# Patient Record
Sex: FEMALE | Race: BLACK OR AFRICAN AMERICAN | Employment: FULL TIME | URBAN - METROPOLITAN AREA
[De-identification: names, ages, dates, MRNs, and addresses within clinical notes are randomized per-mention and may not be internally consistent; named-entity substitution may affect disease eponyms.]

---

## 2017-05-01 ENCOUNTER — OP HISTORICAL/CONVERTED ENCOUNTER (OUTPATIENT)
Dept: OTHER | Age: 47
End: 2017-05-01

## 2019-03-29 ENCOUNTER — ED HISTORICAL/CONVERTED ENCOUNTER (OUTPATIENT)
Dept: OTHER | Age: 49
End: 2019-03-29

## 2019-07-04 ENCOUNTER — ED HISTORICAL/CONVERTED ENCOUNTER (OUTPATIENT)
Dept: OTHER | Age: 49
End: 2019-07-04

## 2019-09-12 ENCOUNTER — ED HISTORICAL/CONVERTED ENCOUNTER (OUTPATIENT)
Dept: OTHER | Age: 49
End: 2019-09-12

## 2020-02-18 ENCOUNTER — ED HISTORICAL/CONVERTED ENCOUNTER (OUTPATIENT)
Dept: OTHER | Age: 50
End: 2020-02-18

## 2020-04-18 ENCOUNTER — ED HISTORICAL/CONVERTED ENCOUNTER (OUTPATIENT)
Dept: OTHER | Age: 50
End: 2020-04-18

## 2020-05-25 ENCOUNTER — ED HISTORICAL/CONVERTED ENCOUNTER (OUTPATIENT)
Dept: OTHER | Age: 50
End: 2020-05-25

## 2020-08-06 ENCOUNTER — ED HISTORICAL/CONVERTED ENCOUNTER (OUTPATIENT)
Dept: OTHER | Age: 50
End: 2020-08-06

## 2020-08-12 ENCOUNTER — ED HISTORICAL/CONVERTED ENCOUNTER (OUTPATIENT)
Dept: OTHER | Age: 50
End: 2020-08-12

## 2020-09-01 ENCOUNTER — OFFICE VISIT (OUTPATIENT)
Dept: INTERNAL MEDICINE CLINIC | Age: 50
End: 2020-09-01
Payer: COMMERCIAL

## 2020-09-01 VITALS
HEIGHT: 61 IN | OXYGEN SATURATION: 98 % | BODY MASS INDEX: 29.11 KG/M2 | SYSTOLIC BLOOD PRESSURE: 138 MMHG | DIASTOLIC BLOOD PRESSURE: 98 MMHG | HEART RATE: 72 BPM | TEMPERATURE: 98.4 F | WEIGHT: 154.2 LBS | RESPIRATION RATE: 18 BRPM

## 2020-09-01 DIAGNOSIS — Z86.59 HISTORY OF DEPRESSION: ICD-10-CM

## 2020-09-01 DIAGNOSIS — K21.9 GASTROESOPHAGEAL REFLUX DISEASE WITHOUT ESOPHAGITIS: ICD-10-CM

## 2020-09-01 DIAGNOSIS — E66.3 OVERWEIGHT WITH BODY MASS INDEX (BMI) OF 29 TO 29.9 IN ADULT: ICD-10-CM

## 2020-09-01 DIAGNOSIS — M25.511 RIGHT SHOULDER PAIN, UNSPECIFIED CHRONICITY: ICD-10-CM

## 2020-09-01 DIAGNOSIS — G43.829 MENSTRUAL MIGRAINE WITHOUT STATUS MIGRAINOSUS, NOT INTRACTABLE: ICD-10-CM

## 2020-09-01 DIAGNOSIS — Z82.49 FAMILY HISTORY OF HYPERTENSION: ICD-10-CM

## 2020-09-01 DIAGNOSIS — M25.511 ACUTE PAIN OF RIGHT SHOULDER: ICD-10-CM

## 2020-09-01 DIAGNOSIS — Z86.79 HISTORY OF HYPERTENSION: ICD-10-CM

## 2020-09-01 DIAGNOSIS — Z82.3 FAMILY HISTORY OF STROKE: ICD-10-CM

## 2020-09-01 DIAGNOSIS — R60.9 PERIPHERAL EDEMA: ICD-10-CM

## 2020-09-01 DIAGNOSIS — I10 ESSENTIAL HYPERTENSION: Primary | ICD-10-CM

## 2020-09-01 DIAGNOSIS — E55.9 VITAMIN D DEFICIENCY: ICD-10-CM

## 2020-09-01 PROCEDURE — 99204 OFFICE O/P NEW MOD 45 MIN: CPT | Performed by: INTERNAL MEDICINE

## 2020-09-01 RX ORDER — HYDROCHLOROTHIAZIDE 12.5 MG/1
12.5 TABLET ORAL
Qty: 30 TAB | Refills: 1 | Status: SHIPPED | OUTPATIENT
Start: 2020-09-01 | End: 2020-10-23 | Stop reason: SDUPTHER

## 2020-09-01 RX ORDER — OMEPRAZOLE 40 MG/1
40 CAPSULE, DELAYED RELEASE ORAL DAILY
Qty: 30 CAP | Refills: 1 | Status: SHIPPED | OUTPATIENT
Start: 2020-09-01 | End: 2020-10-23 | Stop reason: ALTCHOICE

## 2020-09-01 NOTE — PROGRESS NOTES
Laurence Vaughan is a 51 y.o. female and presents with Establish Care (C/O RT SHOULDER pain  3/10 seen [facility] free standing  ER 2020, Also C/O swelling both legs X several months)     she came to establish with me she has been referred by 1 of my patient, she works in the group home, taking care of mentally challenged patient, she had visited,, emergency room near [facility] she did not bring discharge summary, she had acute sharp right shoulder pain started all of a sudden when she was sleeping sometimes she has to lift patient, she was prescribed diclofenac and cyclobenzaprine, now she has no pain she has good range of movement, she could not, take cyclobenzaprine causing drowsiness she has history of headache, while having menstruation, may have a menstruation migraine she takes ibuprofen, she told me that she takes over-the-counter 2 pills of ibuprofen every 6 hourly she complains of swelling, of both lower legs for last several months she had history of depression and hypertension in the past, she used to see nurse practitioner, Ms. Ernestina Nagel at [facility] however according to her she has, retired, she has not done any recent labs she was taken off from antihypertensive by her she told me she is not, having any depression she follows gynecologist, she has history of partial hysterectomy, she also has GERD she told me she was negative for STD she never smokes cigarette or cigar she drinks alcohol occasionally she does not take any recreational drugs, she is a family history of hypertension and history of stroke with her sister who  and she had her father who had pancreatic cancer in mother who had lung cancer. She has never done any lab work on does not take any vitamins. She takes care of the patient who are on disability. No negative thoughts. No anxiety. Her BMI is 29.1. Today her blood pressure is elevated in both upper arms. She eats fast food.       Review of Systems    Review of Systems   Constitutional: Negative. HENT: Negative for congestion and sinus pain. Eyes: Negative for blurred vision. Respiratory: Negative for cough, shortness of breath and wheezing. Cardiovascular: Positive for leg swelling. Complaining of mild swelling of both lower legs for last several months   Gastrointestinal: Negative. Genitourinary: Negative. Musculoskeletal: Positive for joint pain. She had a acute right shoulder pain 2 weeks ago no history of fall or trauma feeling better now, having normal range of movement,.,   Neurological: Negative for dizziness, tingling and weakness. Psychiatric/Behavioral: Negative for depression. The patient is not nervous/anxious and does not have insomnia. Past Medical History:   Diagnosis Date    GERD (gastroesophageal reflux disease)      Past Surgical History:   Procedure Laterality Date    HX GYN      HX PARTIAL HYSTERECTOMY  05/12/2012     Social History     Socioeconomic History    Marital status: SINGLE     Spouse name: Not on file    Number of children: Not on file    Years of education: Not on file    Highest education level: Not on file   Tobacco Use    Smoking status: Never Smoker    Smokeless tobacco: Never Used   Substance and Sexual Activity    Alcohol use: Not Currently    Drug use: Never     Family History   Problem Relation Age of Onset    Hypertension Mother     Cancer Mother     Lung Cancer Mother     Hypertension Father     Cancer Father     Pancreatic Cancer Father        Allergies   Allergen Reactions    Penicillins Rash     ALSO ALLERGIC SEAFOOD  HIVES       Objective:  Visit Vitals  BP (!) 138/98 (BP 1 Location: Right arm, BP Patient Position: Sitting)   Pulse 72   Temp 98.4 °F (36.9 °C) (Oral)   Resp 18   Ht 5' 1\" (1.549 m)   Wt 154 lb 3.2 oz (69.9 kg)   SpO2 98%   BMI 29.14 kg/m²       Physical Exam:   Constitutional: General Appearance: healthy-appearing / obese. Level of Distress: NAD. Ambulation: ambulating   Psychiatric: Mental Status: normal mood and affect and active and alert. Orientation: to time, place, and person. no agitation. ,normal eye contact. normal insight  Head: Head: normocephalic and atraumatic. Eyes: Pupils: PERRLA. Sclerae: non-icteric. Neck: Neck: supple, trachea midline, and no masses. Lymph Nodes: no cervical LAD. Thyroid: no enlargement or nodules and non-tender. Lungs: Respiratory effort: no dyspnea. Auscultation: no wheezing, rales/crackles, or rhonchi and breath sounds normal and good air movement. Cardiovascular: Apical Impulse: not displaced. Heart Auscultation: normal S1 and S2; no murmurs, rubs, or gallops; and RRR. Neck vessels: no carotid bruits. Pulses including femoral / pedal: normal throughout. Abdomen: Bowel Sounds: normal. Inspection and Palpation: no tenderness, guarding, or masses and soft and non-distended. Liver: non-tender and no hepatomegaly. Spleen: non-tender and no splenomegaly. Musculoskeletal[de-identified] Extremities: no edema,no varicosities. No Calf tenderness. Neurologic: Gait and Station: normal gait and station. Motor Strength normal right and left. Skin: Inspection and palpation: no rash, lesions, or ulcer. No results found for this or any previous visit. Assessment/Plan:    ICD-10-CM ICD-9-CM    1. Essential hypertension  I10 401.9 CBC WITH AUTOMATED DIFF      METABOLIC PANEL, COMPREHENSIVE      TSH 3RD GENERATION      URINALYSIS W/ RFLX MICROSCOPIC   2. Acute pain of right shoulder  M25.511 719.41    3. Peripheral edema  R60.9 782.3    4. Menstrual migraine without status migrainosus, not intractable  G43.829 346.40    5. Gastroesophageal reflux disease without esophagitis  K21.9 530.81    6. Family history of hypertension  Z82.49 V17.49    7. Family history of stroke  Z82.3 V17.1    8. History of hypertension  Z86.79 V12.59    9. History of depression  Z86.59 V11.8    10.  Vitamin D deficiency  E55.9 268.9 VITAMIN D, 25 HYDROXY 11. Right shoulder pain, unspecified chronicity  M25.511 719.41    12. Overweight with body mass index (BMI) of 29 to 29.9 in adult  E66.3 278.02     Z68.29 V85.25      Orders Placed This Encounter    CBC WITH AUTOMATED DIFF    METABOLIC PANEL, COMPREHENSIVE    TSH 3RD GENERATION    URINALYSIS W/ RFLX MICROSCOPIC    VITAMIN D, 25 HYDROXY    hydroCHLOROthiazide (HYDRODIURIL) 12.5 mg tablet     Sig: Take 1 Tab by mouth every morning. Indications: visible water retention, high blood pressure     Dispense:  30 Tab     Refill:  1    omeprazole (PRILOSEC) 40 mg capsule     Sig: Take 1 Cap by mouth daily. Take 30 minutes before eating once a day . 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 Also eating fast food, not doing much exercise, not checking blood pressure and she had history of hypertension, but was taken off from antihypertensive by her, previous PCP Ms. Tobin Rojo. 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 Started on hydrochlorothiazide 12.5 mg once a day side effects discussed including electrolyte disturbance hyponatremia and hypokalemia take diet rich in potassium and  labs ordered. Peripheral edema may be due to long-term use of NSAIDs also may be due to prolonged standing, keep legs propped up position, started on hydrochlorothiazide,. Diet low in sodium, TSH ordered.       Menstrual migraine she can take Tylenol use NSAIDs very sparingly and follow the recommendation given by gynecologist.    GERD started on omeprazole may be due to taking NSAIDs and fast food. Right shoulder pain currently she has normal range of movement and she can take Tylenol 650 mg twice a day as needed. Heating pad application,  And it seems like she has almost recovered and no history of fall or trauma f  Family history of hypertension and stroke heart healthy diet and DASH diet, and exercise. Personal history of hypertension blood pressure monitoring at home. Diet low in sodium and started on HCTZ. History of depression currently no negative thoughts. ,    Vitamin D deficiency vitamin D level ordered. overweight lifestyle modification and BMI is 29.1. Education and counseling given answered all her questions and follow-up in 6 weeks with blood pressure log and fasting labs ordered. Refills given and side effects discussed. lose weight, increase physical activity, bring BP log to office visit, follow low fat diet, follow low salt diet, routine labs ordered, call if any problems    There are no Patient Instructions on file for this visit. Follow-up and Dispositions    · Return in about 6 weeks (around 10/13/2020) for htn ,leo edema ,headache ,nonfasting labs now.

## 2020-09-05 ENCOUNTER — TELEPHONE (OUTPATIENT)
Dept: INTERNAL MEDICINE CLINIC | Age: 50
End: 2020-09-05

## 2020-09-05 LAB
25(OH)D3+25(OH)D2 SERPL-MCNC: 14.2 NG/ML (ref 30–100)
ALBUMIN SERPL-MCNC: 4.8 G/DL (ref 3.8–4.8)
ALBUMIN/GLOB SERPL: 1.7 {RATIO} (ref 1.2–2.2)
ALP SERPL-CCNC: 65 IU/L (ref 39–117)
ALT SERPL-CCNC: 36 IU/L (ref 0–32)
APPEARANCE UR: CLEAR
AST SERPL-CCNC: 34 IU/L (ref 0–40)
BASOPHILS # BLD AUTO: 0.1 X10E3/UL (ref 0–0.2)
BASOPHILS NFR BLD AUTO: 1 %
BILIRUB SERPL-MCNC: 0.4 MG/DL (ref 0–1.2)
BILIRUB UR QL STRIP: NEGATIVE
BUN SERPL-MCNC: 6 MG/DL (ref 6–24)
BUN/CREAT SERPL: 8 (ref 9–23)
CALCIUM SERPL-MCNC: 9.9 MG/DL (ref 8.7–10.2)
CHLORIDE SERPL-SCNC: 94 MMOL/L (ref 96–106)
CO2 SERPL-SCNC: 24 MMOL/L (ref 20–29)
COLOR UR: YELLOW
CREAT SERPL-MCNC: 0.77 MG/DL (ref 0.57–1)
EOSINOPHIL # BLD AUTO: 0.1 X10E3/UL (ref 0–0.4)
EOSINOPHIL NFR BLD AUTO: 1 %
ERYTHROCYTE [DISTWIDTH] IN BLOOD BY AUTOMATED COUNT: 12.5 % (ref 11.7–15.4)
GLOBULIN SER CALC-MCNC: 2.8 G/DL (ref 1.5–4.5)
GLUCOSE SERPL-MCNC: 87 MG/DL (ref 65–99)
GLUCOSE UR QL: NEGATIVE
HCT VFR BLD AUTO: 41.1 % (ref 34–46.6)
HGB BLD-MCNC: 13.4 G/DL (ref 11.1–15.9)
HGB UR QL STRIP: NEGATIVE
IMM GRANULOCYTES # BLD AUTO: 0 X10E3/UL (ref 0–0.1)
IMM GRANULOCYTES NFR BLD AUTO: 0 %
KETONES UR QL STRIP: NEGATIVE
LEUKOCYTE ESTERASE UR QL STRIP: NEGATIVE
LYMPHOCYTES # BLD AUTO: 2.4 X10E3/UL (ref 0.7–3.1)
LYMPHOCYTES NFR BLD AUTO: 42 %
MCH RBC QN AUTO: 28.8 PG (ref 26.6–33)
MCHC RBC AUTO-ENTMCNC: 32.6 G/DL (ref 31.5–35.7)
MCV RBC AUTO: 88 FL (ref 79–97)
MICRO URNS: NORMAL
MONOCYTES # BLD AUTO: 0.6 X10E3/UL (ref 0.1–0.9)
MONOCYTES NFR BLD AUTO: 10 %
NEUTROPHILS # BLD AUTO: 2.6 X10E3/UL (ref 1.4–7)
NEUTROPHILS NFR BLD AUTO: 46 %
NITRITE UR QL STRIP: NEGATIVE
PH UR STRIP: 6.5 [PH] (ref 5–7.5)
PLATELET # BLD AUTO: 312 X10E3/UL (ref 150–450)
POTASSIUM SERPL-SCNC: 3.7 MMOL/L (ref 3.5–5.2)
PROT SERPL-MCNC: 7.6 G/DL (ref 6–8.5)
PROT UR QL STRIP: NEGATIVE
RBC # BLD AUTO: 4.65 X10E6/UL (ref 3.77–5.28)
SODIUM SERPL-SCNC: 135 MMOL/L (ref 134–144)
SP GR UR: 1.01 (ref 1–1.03)
TSH SERPL DL<=0.005 MIU/L-ACNC: 0.8 UIU/ML (ref 0.45–4.5)
UROBILINOGEN UR STRIP-MCNC: 0.2 MG/DL (ref 0.2–1)
WBC # BLD AUTO: 5.7 X10E3/UL (ref 3.4–10.8)

## 2020-09-05 RX ORDER — ERGOCALCIFEROL 1.25 MG/1
50000 CAPSULE ORAL
Qty: 12 CAP | Refills: 0 | Status: SHIPPED | OUTPATIENT
Start: 2020-09-05 | End: 2020-10-23 | Stop reason: ALTCHOICE

## 2020-09-05 NOTE — TELEPHONE ENCOUNTER
S/W PT. To review results of labs, per Dr. Manuel Elizabeth, VIT D level is low 14.2 ( should at least be 30.3), RX has been sent to the pharmacy 50,000 units to take once weekly,start increasing the amount protein in your diet, SGPT 36 ( should not be over 32), walk more, and decrease the amt of NSAIDS ( aleve, advil ,ibuprofen and naproxen), Pt. Agreed.   TSH ( thyroid is  Normal.

## 2020-09-05 NOTE — PROGRESS NOTES
Please call her    #1 she should take in the increase intake of protein in the diet. 2.  Her vitamin D is low I will send prescription of vitamin D once a week for 3 months to the pharmacy. 3.  She should walk more because her one liver enzyme is minimally elevated it is called fatty liver and do not take excessive use of NSAIDs over-the-counter. It should be mindful use.     4.  Thyroid is normal.

## 2020-10-13 PROBLEM — R93.0 ABNORMAL FINDINGS ON DIAGNOSTIC IMAGING OF SKULL AND HEAD, NOT ELSEWHERE CLASSIFIED: Status: ACTIVE | Noted: 2020-10-13

## 2020-10-13 PROBLEM — N94.6 DYSMENORRHEA: Status: ACTIVE | Noted: 2020-10-13

## 2020-10-13 PROBLEM — N92.1 IRREGULAR INTERMENSTRUAL BLEEDING: Status: ACTIVE | Noted: 2020-10-13

## 2020-10-23 ENCOUNTER — OFFICE VISIT (OUTPATIENT)
Dept: INTERNAL MEDICINE CLINIC | Age: 50
End: 2020-10-23
Payer: COMMERCIAL

## 2020-10-23 VITALS
HEART RATE: 66 BPM | BODY MASS INDEX: 28.85 KG/M2 | RESPIRATION RATE: 18 BRPM | DIASTOLIC BLOOD PRESSURE: 70 MMHG | TEMPERATURE: 98 F | SYSTOLIC BLOOD PRESSURE: 118 MMHG | HEIGHT: 61 IN | OXYGEN SATURATION: 99 % | WEIGHT: 152.8 LBS

## 2020-10-23 DIAGNOSIS — Z23 ENCOUNTER FOR IMMUNIZATION: ICD-10-CM

## 2020-10-23 DIAGNOSIS — K21.9 GASTROESOPHAGEAL REFLUX DISEASE WITHOUT ESOPHAGITIS: ICD-10-CM

## 2020-10-23 DIAGNOSIS — Z23 NEEDS FLU SHOT: ICD-10-CM

## 2020-10-23 DIAGNOSIS — I10 ESSENTIAL HYPERTENSION: Primary | ICD-10-CM

## 2020-10-23 PROCEDURE — 99213 OFFICE O/P EST LOW 20 MIN: CPT | Performed by: INTERNAL MEDICINE

## 2020-10-23 PROCEDURE — 90756 CCIIV4 VACC ABX FREE IM: CPT | Performed by: INTERNAL MEDICINE

## 2020-10-23 RX ORDER — HYDROCHLOROTHIAZIDE 12.5 MG/1
12.5 TABLET ORAL EVERY MORNING
Qty: 90 TAB | Refills: 1 | Status: SHIPPED | OUTPATIENT
Start: 2020-10-23 | End: 2021-05-10

## 2020-10-23 RX ORDER — OMEPRAZOLE 40 MG/1
1 CAPSULE, DELAYED RELEASE ORAL DAILY
COMMUNITY
End: 2020-10-23 | Stop reason: SDUPTHER

## 2020-10-23 RX ORDER — CYCLOBENZAPRINE HCL 10 MG
1 TABLET ORAL DAILY
COMMUNITY
Start: 2020-08-19 | End: 2020-10-23 | Stop reason: ALTCHOICE

## 2020-10-23 RX ORDER — DICLOFENAC SODIUM 50 MG/1
1 TABLET, DELAYED RELEASE ORAL DAILY
COMMUNITY
Start: 2020-08-19 | End: 2020-10-23 | Stop reason: ALTCHOICE

## 2020-10-23 RX ORDER — OMEPRAZOLE 40 MG/1
40 CAPSULE, DELAYED RELEASE ORAL
Qty: 90 CAP | Refills: 0 | Status: SHIPPED | OUTPATIENT
Start: 2020-10-23 | End: 2022-04-01

## 2020-10-23 NOTE — PROGRESS NOTES
Payton Gonzalez is a 51 y.o. female and presents with Follow-up; Hypertension; and Follow Up Chronic Condition    Her blood pressure is wonderfully controlled being on hydrochlorothiazide 12.5 mg/day she has no more shoulder pain, she has great improvement after being on omeprazole, she has started drinking green smoothie and, eating healthy diet and started walking and doing exercise, she wants to have flu vaccine , she has done her labs I reviewed and discussed with her.     her vitamin D level is low ,I ordered vitamin D once a week for 2 months and then OTC vitamin D3 2000 units/day. Her vitamin D level is 14.2,Her ALT is 36. I discussed with her. Review of Systems    Review of Systems   Constitutional: Negative. HENT: Negative for sinus pain. Eyes: Negative for blurred vision. Respiratory: Negative for shortness of breath and wheezing. Cardiovascular: Negative. Gastrointestinal: Negative. Musculoskeletal: Negative. Neurological: Negative for dizziness, tremors and headaches. Psychiatric/Behavioral: Negative for depression. The patient is not nervous/anxious.          Past Medical History:   Diagnosis Date    Abnormal findings on diagnostic imaging of skull and head, not elsewhere classified 10/13/2020    Dysmenorrhea 10/13/2020    GERD (gastroesophageal reflux disease)     Irregular intermenstrual bleeding 10/13/2020     Past Surgical History:   Procedure Laterality Date    HX GYN      HX PARTIAL HYSTERECTOMY  05/12/2012     Social History     Socioeconomic History    Marital status: SINGLE     Spouse name: Not on file    Number of children: Not on file    Years of education: Not on file    Highest education level: Not on file   Tobacco Use    Smoking status: Never Smoker    Smokeless tobacco: Never Used   Substance and Sexual Activity    Alcohol use: Not Currently    Drug use: Never     Family History   Problem Relation Age of Onset    Hypertension Mother     Cancer Mother    [place] Mother     Hypertension Father     Cancer Father     Pancreatic Cancer Father      Current Outpatient Medications   Medication Sig Dispense Refill    omeprazole (PRILOSEC) 40 mg capsule Take 1 Cap by mouth daily as needed for Pain or Other (if gerd worsens). Indications: gastroesophageal reflux disease 90 Cap 0    hydroCHLOROthiazide (HYDRODIURIL) 12.5 mg tablet Take 1 Tab by mouth Every morning. Indications: visible water retention, high blood pressure 90 Tab 1     Allergies   Allergen Reactions    Penicillins Rash     ALSO ALLERGIC SEAFOOD  HIVES       Objective:  Visit Vitals  /70 (BP 1 Location: Right arm, BP Patient Position: Sitting)   Pulse 66   Temp 98 °F (36.7 °C) (Temporal)   Resp 18   Ht 5' 1\" (1.549 m)   Wt 152 lb 12.8 oz (69.3 kg)   SpO2 99%   BMI 28.87 kg/m²     Hypertension controlled  Physical Exam:   Constitutional: General Appearance: Mild overweight with pleasant personality. Level of Distress: NAD. Psychiatric: Mental Status: normal mood and affect Orientation: to time, place, and person. ,normal eye contact. Head: Head: normocephalic and atraumatic. Eyes: Pupils: PERRLA. Sclerae: non-icteric. Neck: Neck: supple, trachea midline, and no masses. Lymph Nodes: no cervical LAD. Thyroid: no enlargement or nodules and non-tender. Lungs: Respiratory effort: no dyspnea. Auscultation: no wheezing, rales/crackles, or rhonchi and breath sounds normal and good air movement. Cardiovascular: Apical Impulse: not displaced. Heart Auscultation: normal S1 and S2; no murmurs, rubs, or gallops; and RRR. Neck vessels: no carotid bruits. Pulses including femoral / pedal: normal throughout. Abdomen: Bowel Sounds: normal. Inspection and Palpation: no tenderness, guarding, or masses and soft and non-distended. Liver: non-tender and no hepatomegaly. Spleen: non-tender and no splenomegaly. Musculoskeletal[de-identified] Extremities: no edema,no varicosities.  No Calf tenderness. Neurologic: Gait and Station: normal gait and station. Motor Strength normal right and left. Skin: Inspection and palpation: no rash, lesions, or ulcer. Results for orders placed or performed in visit on 09/01/20   CBC WITH AUTOMATED DIFF   Result Value Ref Range    WBC 5.7 3.4 - 10.8 x10E3/uL    RBC 4.65 3.77 - 5.28 x10E6/uL    HGB 13.4 11.1 - 15.9 g/dL    HCT 41.1 34.0 - 46.6 %    MCV 88 79 - 97 fL    MCH 28.8 26.6 - 33.0 pg    MCHC 32.6 31.5 - 35.7 g/dL    RDW 12.5 11.7 - 15.4 %    PLATELET 120 741 - 125 x10E3/uL    NEUTROPHILS 46 Not Estab. %    Lymphocytes 42 Not Estab. %    MONOCYTES 10 Not Estab. %    EOSINOPHILS 1 Not Estab. %    BASOPHILS 1 Not Estab. %    ABS. NEUTROPHILS 2.6 1.4 - 7.0 x10E3/uL    Abs Lymphocytes 2.4 0.7 - 3.1 x10E3/uL    ABS. MONOCYTES 0.6 0.1 - 0.9 x10E3/uL    ABS. EOSINOPHILS 0.1 0.0 - 0.4 x10E3/uL    ABS. BASOPHILS 0.1 0.0 - 0.2 x10E3/uL    IMMATURE GRANULOCYTES 0 Not Estab. %    ABS. IMM. GRANS. 0.0 0.0 - 0.1 [id number]/KI   METABOLIC PANEL, COMPREHENSIVE   Result Value Ref Range    Glucose 87 65 - 99 mg/dL    BUN 6 6 - 24 mg/dL    Creatinine 0.77 0.57 - 1.00 mg/dL    GFR est non-AA 92 >59 mL/min/1.73    GFR est  >59 mL/min/1.73    BUN/Creatinine ratio 8 (L) 9 - 23    Sodium 135 134 - 144 mmol/L    Potassium 3.7 3.5 - 5.2 mmol/L    Chloride 94 (L) 96 - 106 mmol/L    CO2 24 20 - 29 mmol/L    Calcium 9.9 8.7 - 10.2 mg/dL    Protein, total 7.6 6.0 - 8.5 g/dL    Albumin 4.8 3.8 - 4.8 g/dL    GLOBULIN, TOTAL 2.8 1.5 - 4.5 g/dL    A-G Ratio 1.7 1.2 - 2.2    Bilirubin, total 0.4 0.0 - 1.2 mg/dL    Alk.  phosphatase 65 39 - 117 IU/L    AST (SGOT) 34 0 - 40 IU/L    ALT (SGPT) 36 (H) 0 - 32 IU/L   TSH 3RD GENERATION   Result Value Ref Range    TSH 0.797 0.450 - 4.500 uIU/mL   URINALYSIS W/ RFLX MICROSCOPIC   Result Value Ref Range    Specific Gravity 1.010 1.005 - 1.030    pH (UA) 6.5 5.0 - 7.5    Color Yellow Yellow    Appearance Clear Clear    Leukocyte Esterase Negative Negative    Protein Negative Negative/Trace    Glucose Negative Negative    Ketone Negative Negative    Blood Negative Negative    Bilirubin Negative Negative    Urobilinogen 0.2 0.2 - 1.0 mg/dL    Nitrites Negative Negative    Microscopic Examination Comment    VITAMIN D, 25 HYDROXY   Result Value Ref Range    VITAMIN D, 25-HYDROXY 14.2 (L) 30.0 - 100.0 ng/mL       Assessment/Plan:      ICD-10-CM ICD-9-CM    1. Essential hypertension  I10 401.9    2. Gastroesophageal reflux disease without esophagitis  K21.9 530.81    3. Encounter for immunization  Z23 V03.89    4. Needs flu shot  Z23 V04.81 INFLUENZA VACCINE (CCIIV4 VACCINE ANTIBIO FREE 0.5 ML)     Orders Placed This Encounter    Influenza Vaccine, QUAD, Vial (Flucelvax VIAL 61821)    DISCONTD: cyclobenzaprine (FLEXERIL) 10 mg tablet     Sig: Take 1 Tab by mouth daily.  DISCONTD: diclofenac EC (VOLTAREN) 50 mg EC tablet     Sig: Take 1 Tab by mouth daily.  DISCONTD: omeprazole (PRILOSEC) 40 mg capsule     Sig: Take 1 Cap by mouth daily.  omeprazole (PRILOSEC) 40 mg capsule     Sig: Take 1 Cap by mouth daily as needed for Pain or Other (if gerd worsens). Indications: gastroesophageal reflux disease     Dispense:  90 Cap     Refill:  0    hydroCHLOROthiazide (HYDRODIURIL) 12.5 mg tablet     Sig: Take 1 Tab by mouth Every morning. Indications: visible water retention, high blood pressure     Dispense:  90 Tab     Refill:  1         Hypertension controlled, hydrochlorothiazide 12.5 mg once a day. Diet low in sodium. GERD continue omeprazole as needed explained that she should not take every day if she has no symptoms to prevent C. difficile infection and osteopenia and other side effects. Shoulder pain she is completely asymptomatic. Overweight she has started walking and eating healthy diet. Vitamin D deficiency vitamin D once a week for 3 months and then OTC vitamin D3 2000 units/day. She wants to see me in 3 months.   Lab results reviewed and discussed with her. Answered all her questions. Refills given. flu vaccine given in the office. lose weight, increase physical activity, follow low fat diet, follow low salt diet, continue present plan, call if any problems    There are no Patient Instructions on file for this visit. Follow-up and Dispositions    · Return in about 3 months (around 1/23/2021) for htn,gerd ,flu.

## 2021-01-25 ENCOUNTER — OFFICE VISIT (OUTPATIENT)
Dept: INTERNAL MEDICINE CLINIC | Age: 51
End: 2021-01-25
Payer: COMMERCIAL

## 2021-01-25 VITALS
BODY MASS INDEX: 27.94 KG/M2 | DIASTOLIC BLOOD PRESSURE: 68 MMHG | SYSTOLIC BLOOD PRESSURE: 118 MMHG | RESPIRATION RATE: 18 BRPM | OXYGEN SATURATION: 99 % | HEIGHT: 61 IN | WEIGHT: 148 LBS | HEART RATE: 72 BPM

## 2021-01-25 DIAGNOSIS — K21.9 GASTROESOPHAGEAL REFLUX DISEASE WITHOUT ESOPHAGITIS: ICD-10-CM

## 2021-01-25 DIAGNOSIS — E55.9 VITAMIN D DEFICIENCY: ICD-10-CM

## 2021-01-25 DIAGNOSIS — F41.0 ANXIETY ATTACK: ICD-10-CM

## 2021-01-25 DIAGNOSIS — I10 ESSENTIAL HYPERTENSION: ICD-10-CM

## 2021-01-25 PROCEDURE — 99213 OFFICE O/P EST LOW 20 MIN: CPT | Performed by: INTERNAL MEDICINE

## 2021-01-25 RX ORDER — HYDROXYZINE 25 MG/1
25 TABLET, FILM COATED ORAL
Qty: 60 TAB | Refills: 1 | Status: SHIPPED | OUTPATIENT
Start: 2021-01-25 | End: 2021-02-24

## 2021-01-25 NOTE — PROGRESS NOTES
Hilda Cano is a 53 y.o. female and presents with Follow Up Chronic Condition (htn,gerd )      Ms. Tanya Bhat came for regular follow-up. 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 Sometimes she has stress and anxiety and sometimes she cries but she does not want to be on antidepression because she told me that she is not depressed but started on hydroxyzine having anxiety spells. Her blood pressure is wonderfully controlled. After being on hydrochlorothiazide she has no complain of ankle swelling. She takes omeprazole as needed. She has wonderful pleasant personality. she had low vitamin D level and she was given high-dose for 3 months and now taking over-the-counter medication . Review of Systems    Review of Systems   Constitutional: Negative. HENT: Negative for hearing loss and sore throat. Eyes: Negative for blurred vision. Respiratory: Negative for cough and wheezing. Cardiovascular: Negative. Gastrointestinal: Negative. Genitourinary: Negative. Musculoskeletal: Negative. Negative for joint pain and myalgias. Neurological: Negative for dizziness and headaches. Psychiatric/Behavioral: Negative for depression. The patient is nervous/anxious.          Past Medical History:   Diagnosis Date    Abnormal findings on diagnostic imaging of skull and head, not elsewhere classified 10/13/2020    Dysmenorrhea 10/13/2020    Essential hypertension 1/25/2021    GERD (gastroesophageal reflux disease)     Irregular intermenstrual bleeding 10/13/2020     Past Surgical History:   Procedure Laterality Date    HX GYN      HX PARTIAL HYSTERECTOMY  05/12/2012     Social History     Socioeconomic History    Marital status: SINGLE     Spouse name: Not on file    Number of children: Not on file    Years of education: Not on file    Highest education level: Not on file   Tobacco Use    Smoking status: Never Smoker    Smokeless tobacco: Never Used   Substance and Sexual Activity    Alcohol use: Not Currently    Drug use: Never     Family History   Problem Relation Age of Onset    Hypertension Mother     Cancer Mother     Lung Cancer Mother     Hypertension Father     Cancer Father     Pancreatic Cancer Father      Current Outpatient Medications   Medication Sig Dispense Refill    hydrOXYzine HCL (ATARAX) 25 mg tablet Take 1 Tab by mouth two (2) times daily as needed for Anxiety for up to 30 days. 60 Tab 1    omeprazole (PRILOSEC) 40 mg capsule Take 1 Cap by mouth daily as needed for Pain or Other (if gerd worsens). Indications: gastroesophageal reflux disease 90 Cap 0    hydroCHLOROthiazide (HYDRODIURIL) 12.5 mg tablet Take 1 Tab by mouth Every morning. Indications: visible water retention, high blood pressure 90 Tab 1     Allergies   Allergen Reactions    Penicillins Rash     ALSO ALLERGIC SEAFOOD  HIVES       Objective:  Visit Vitals  /68 (BP 1 Location: Right arm, BP Patient Position: Sitting)   Pulse 72   Resp 18   Ht 5' 1\" (1.549 m)   Wt 148 lb (67.1 kg)   SpO2 99%   BMI 27.96 kg/m²       Physical Exam:   Constitutional: General Appearance: Pleasant. Level of Distress: NAD. Psychiatric: Mental Status: normal mood and affect Orientation: to time, place, and person. ,normal eye contact. Head: Head: normocephalic and atraumatic. Eyes: Pupils: PERRLA. Sclerae: non-icteric. Neck: Neck: supple, trachea midline, and no masses. Lymph Nodes: no cervical LAD. Thyroid: no enlargement or nodules and non-tender. Lungs: Respiratory effort: no dyspnea. Auscultation: no wheezing, rales/crackles, or rhonchi and breath sounds normal and good air movement. Cardiovascular: Apical Impulse: not displaced. Heart Auscultation: normal S1 and S2; no murmurs, rubs, or gallops; and RRR. Neck vessels: no carotid bruits. Pulses including femoral / pedal: normal throughout. Abdomen:  Bowel Sounds: normal. Inspection and Palpation: no tenderness, guarding, or masses and soft and non-distended. Liver: non-tender and no hepatomegaly. Spleen: non-tender and no splenomegaly. Musculoskeletal[de-identified] Extremities: no edema,no varicosities. No Calf tenderness. Neurologic: Gait and Station: normal gait and station. Motor Strength normal right and left. Skin: Inspection and palpation: no rash, lesions, or ulcer. Results for orders placed or performed in visit on 09/01/20   CBC WITH AUTOMATED DIFF   Result Value Ref Range    WBC 5.7 3.4 - 10.8 x10E3/uL    RBC 4.65 3.77 - 5.28 x10E6/uL    HGB 13.4 11.1 - 15.9 g/dL    HCT 41.1 34.0 - 46.6 %    MCV 88 79 - 97 fL    MCH 28.8 26.6 - 33.0 pg    MCHC 32.6 31.5 - 35.7 g/dL    RDW 12.5 11.7 - 15.4 %    PLATELET 543 934 - 706 x10E3/uL    NEUTROPHILS 46 Not Estab. %    Lymphocytes 42 Not Estab. %    MONOCYTES 10 Not Estab. %    EOSINOPHILS 1 Not Estab. %    BASOPHILS 1 Not Estab. %    ABS. NEUTROPHILS 2.6 1.4 - 7.0 x10E3/uL    Abs Lymphocytes 2.4 0.7 - 3.1 x10E3/uL    ABS. MONOCYTES 0.6 0.1 - 0.9 x10E3/uL    ABS. EOSINOPHILS 0.1 0.0 - 0.4 x10E3/uL    ABS. BASOPHILS 0.1 0.0 - 0.2 x10E3/uL    IMMATURE GRANULOCYTES 0 Not Estab. %    ABS. IMM. GRANS. 0.0 0.0 - 0.1 W36I9/VT   METABOLIC PANEL, COMPREHENSIVE   Result Value Ref Range    Glucose 87 65 - 99 mg/dL    BUN 6 6 - 24 mg/dL    Creatinine 0.77 0.57 - 1.00 mg/dL    GFR est non-AA 92 >59 mL/min/1.73    GFR est  >59 mL/min/1.73    BUN/Creatinine ratio 8 (L) 9 - 23    Sodium 135 134 - 144 mmol/L    Potassium 3.7 3.5 - 5.2 mmol/L    Chloride 94 (L) 96 - 106 mmol/L    CO2 24 20 - 29 mmol/L    Calcium 9.9 8.7 - 10.2 mg/dL    Protein, total 7.6 6.0 - 8.5 g/dL    Albumin 4.8 3.8 - 4.8 g/dL    GLOBULIN, TOTAL 2.8 1.5 - 4.5 g/dL    A-G Ratio 1.7 1.2 - 2.2    Bilirubin, total 0.4 0.0 - 1.2 mg/dL    Alk.  phosphatase 65 39 - 117 IU/L    AST (SGOT) 34 0 - 40 IU/L    ALT (SGPT) 36 (H) 0 - 32 IU/L   TSH 3RD GENERATION   Result Value Ref Range    TSH 0.797 0.450 - 4.500 uIU/mL   URINALYSIS W/ RFLX MICROSCOPIC   Result Value Ref Range    Specific Gravity 1.010 1.005 - 1.030    pH (UA) 6.5 5.0 - 7.5    Color Yellow Yellow    Appearance Clear Clear    Leukocyte Esterase Negative Negative    Protein Negative Negative/Trace    Glucose Negative Negative    Ketone Negative Negative    Blood Negative Negative    Bilirubin Negative Negative    Urobilinogen 0.2 0.2 - 1.0 mg/dL    Nitrites Negative Negative    Microscopic Examination Comment    VITAMIN D, 25 HYDROXY   Result Value Ref Range    VITAMIN D, 25-HYDROXY 14.2 (L) 30.0 - 100.0 ng/mL       Assessment/Plan:      ICD-10-CM ICD-9-CM    1. Essential hypertension  I10 401.9    2. Gastroesophageal reflux disease without esophagitis  K21.9 530.81    3. Anxiety attack  F41.0 300.01    4. Vitamin D deficiency  E55.9 268.9      Orders Placed This Encounter    hydrOXYzine HCL (ATARAX) 25 mg tablet     Sig: Take 1 Tab by mouth two (2) times daily as needed for Anxiety for up to 30 days. Dispense:  60 Tab     Refill:  1       Hypertension controlled no peripheral edema after being on hydrochlorothiazide 12.5 mg once a day. Diet low in sodium. GERD continue omeprazole 40 mg once a day as needed. Vitamin D deficiency continue OTC vitamin D3 2000 unit/day. History of anxiety spells and the stress from the work but no negative thoughts and does not want to be on maintenance medicine with SSRI and started on hydroxyzine 25 mg twice a day as needed. She is due for her colonoscopy. I explained that she can do either Cologuard or screening colonoscopy and she has no family history of colon cancer. She is also due for Pap smear that she go to [facility] height and she had normal Pap smear last year. It was done last year. She is due for this year. Patient is reminded. She can do annual physical with me.    continue present plan, call if any problems    There are no Patient Instructions on file for this visit.    Follow-up and Dispositions    · Return in about 6 months (around 7/25/2021) for htn,gerd ,anxiety ,vit d def.

## 2021-07-27 ENCOUNTER — TELEPHONE (OUTPATIENT)
Dept: INTERNAL MEDICINE CLINIC | Age: 51
End: 2021-07-27

## 2021-07-27 RX ORDER — ESCITALOPRAM OXALATE 10 MG/1
10 TABLET ORAL DAILY
Qty: 30 TABLET | Refills: 2 | Status: SHIPPED | OUTPATIENT
Start: 2021-07-27 | End: 2022-04-01

## 2021-07-27 NOTE — TELEPHONE ENCOUNTER
Pt called stating that she has been having difficulty sleeping, and has been very emotional for the past 2 weeks.

## 2022-01-17 ENCOUNTER — TELEPHONE (OUTPATIENT)
Dept: INTERNAL MEDICINE CLINIC | Age: 52
End: 2022-01-17

## 2022-01-17 ENCOUNTER — NURSE TRIAGE (OUTPATIENT)
Dept: OTHER | Facility: CLINIC | Age: 52
End: 2022-01-17

## 2022-01-17 NOTE — TELEPHONE ENCOUNTER
Dr. Nelsy Castillo at bedside discussing d/c instructions      Zollie Lesches V, RN  09/01/20 9935 Subjective: Caller states \"Chest pain\"     Current Symptoms: Chest pain, worse with coughing. Denies nausea or abd pain. C/O Chills and sore throat    Onset: 4 days ago; intermittent    Associated Symptoms: Fatigue    Pain Severity: 5/10; aching; intermittent    Temperature: Unknown    What has been tried: Warm tea with lemon and honey      Recommended disposition: See PCP within 24hrs; ED/UCC if no appointments available. Care advice provided, patient verbalizes understanding; denies any other questions or concerns; instructed to call back for any new or worsening symptoms. Writer provided warm transfer to Zenaida at [facility] for appointment scheduling    Attention Provider: Thank you for allowing me to participate in the care of your patient. The patient was connected to triage in response to information provided to the [facility]. Please do not respond through this encounter as the response is not directed to a shared pool.       Reason for Disposition   [1] Chest pain lasts > 5 minutes AND [2] occurred > 3 days ago (72 hours) AND [3] NO chest pain or cardiac symptoms now    Protocols used: CHEST PAIN-ADULT-

## 2022-01-17 NOTE — TELEPHONE ENCOUNTER
Received call from ANETTE at [facility], caller not on line. Complaint: chest pains pt ? If has PNA      Market: [employer] Name: [employer] telephone number verified as [phone]    Unsuccessful attempt to re-connect with caller via phone, left message for return call to office     Reason for Disposition   Message left on unidentified voice mail. Phone number verified.     Protocols used: NO CONTACT OR DUPLICATE CONTACT CALL-ADULT-

## 2022-01-25 ENCOUNTER — HOSPITAL ENCOUNTER (EMERGENCY)
Age: 52
Discharge: HOME | End: 2022-01-25
Attending: FAMILY MEDICINE
Payer: COMMERCIAL

## 2022-01-25 VITALS
OXYGEN SATURATION: 100 % | WEIGHT: 150 LBS | HEIGHT: 61 IN | RESPIRATION RATE: 16 BRPM | SYSTOLIC BLOOD PRESSURE: 145 MMHG | TEMPERATURE: 98.1 F | BODY MASS INDEX: 28.32 KG/M2 | HEART RATE: 80 BPM | DIASTOLIC BLOOD PRESSURE: 91 MMHG

## 2022-01-25 DIAGNOSIS — B34.9 VIRAL SYNDROME: Primary | ICD-10-CM

## 2022-01-25 LAB — SARS-COV-2, COV2: NORMAL

## 2022-01-25 PROCEDURE — 99282 EMERGENCY DEPT VISIT SF MDM: CPT

## 2022-01-25 PROCEDURE — U0005 INFEC AGEN DETEC AMPLI PROBE: HCPCS

## 2022-01-25 NOTE — ED PROVIDER NOTES
EMERGENCY DEPARTMENT HISTORY AND PHYSICAL EXAM      Date: 1/25/2022  Patient Name: Stephanie Morgan    History of Presenting Illness     Chief Complaint   Patient presents with    Concern For COVID-19 (Coronavirus)       History Provided By: Patient    HPI: Stephanie Morgan, 49 y.o. female presents to the ED with CC of concern for COVID-19. Patient endorses 1 day of body aches, fatigue. .  She is also been experiencing chills. Denies fevers. Patient denies alleviating nor aggravating factors. Patient denies SOB, chest pain, or any neurological symptoms. There are no other complaints, changes, or physical findings at this time. Past History     Past Medical History:  Past Medical History:   Diagnosis Date    Abnormal findings on diagnostic imaging of skull and head, not elsewhere classified 10/13/2020    Dysmenorrhea 10/13/2020    Essential hypertension 1/25/2021    GERD (gastroesophageal reflux disease)     Irregular intermenstrual bleeding 10/13/2020       Allergies: Allergies   Allergen Reactions    Penicillins Rash     ALSO ALLERGIC SEAFOOD  HIVES       Review of Systems   Vital signs and nursing notes reviewed  Review of Systems   Constitutional: Positive for chills and fatigue. Negative for activity change, appetite change and fever. HENT: Negative for congestion and sore throat. Eyes: Negative for photophobia and visual disturbance. Respiratory: Negative for cough, shortness of breath and wheezing. Cardiovascular: Negative for chest pain, palpitations and leg swelling. Gastrointestinal: Negative for abdominal pain, diarrhea, nausea and vomiting. Endocrine: Negative for cold intolerance and heat intolerance. Musculoskeletal: Positive for myalgias. Negative for gait problem and joint swelling. Skin: Negative for color change and rash. Neurological: Negative for dizziness and headaches.          Physical Exam     Visit Vitals  BP (!) 145/91 (BP 1 Location: Right arm, BP Patient Position: At rest)   Pulse 80   Temp 98.1 °F (36.7 °C)   Resp 16   Ht 5' 1\" (1.549 m)   Wt 68 kg (150 lb)   SpO2 100%   BMI 28.34 kg/m²     CONSTITUTIONAL: Alert, in no distress. Appears stated age. HEAD:  Normocephalic, atraumatic, uvula midline, no muffled voice, no findings of peritonsillar abscess, tolerating secretions with ease  EYES: EOM intact. No conjunctival injection or scleral icterus  Neck:  Supple. No meningismus,  RESP: No increased work of breathing, lungs clear to auscultation bilaterally. No wheezes rales nor rhonchi  CV: Heart is regular rate and rhythm, no murmurs, no JVD, capillary refill less than 2 seconds  NEURO: Moves all extremities spontaneously. No motor nor sensory deficits. No focal neurologic deficits. PSYCH: Normal mood, normal affect      Medical Decision Making   Patient presents for COVID 19 testing with normal oxygen saturation and mild viral/ URI symptoms or COVID 19 exposure. COVID 19 testing was conducted. The patient was given quarantine/isolation recommendations and agrees with the plan to be discharged home. They were provided instructions to return for difficulty breathing, chest pain, altered mentation, or any other new or worsening symptoms. ED Course:   Initial assessment performed. The patients presenting problems have been discussed, and they are in agreement with the care plan formulated and outlined with them. I have encouraged them to ask questions as they arise throughout their visit. Patient's lungs are clear to auscultation bilaterally. No increased work of breathing. Patient is nontoxic and overall well-appearing. Critical Care Time: None    Disposition:  DISCHARGE NOTE:  The pt is ready for discharge. The pt's signs, symptoms, diagnosis, and discharge instructions have been discussed and pt has conveyed their understanding. The pt is to follow up as recommended or return to ER should their symptoms worsen.  Plan has been discussed and pt is in agreement. PLAN:  1. Current Discharge Medication List        2. Follow-up Information    None       3. Take Tylenol or Ibuprofen as needed  4. Drink plenty of fluids  5. Return to ED if worse especially if any shortness of breath, chest pain or altered mentation. Diagnosis     Clinical Impression:   1. Viral syndrome            Please note that this dictation was completed with Test[employer], the computer voice recognition software. Quite often unanticipated grammatical, syntax, homophones, and other interpretive errors are inadvertently transcribed by the computer software. Please disregards these errors. Please excuse any errors that have escaped final proofreading.

## 2022-01-25 NOTE — Clinical Note
[address] [facility] EMERGENCY DEPARTMENT  [address] [address]  [phone]    Work/School Note    Date: 1/25/2022     To Whom It May concern:    Dewayne Schneider was evaluated by the following provider(s):  Attending Provider: Joni Sen virus is suspected. Per the CDC guidelines we recommend home isolation until the following conditions are all met:    1. At least five days have passed since symptoms first appeared and/or had a close exposure,   2. After home isolation for five days, wearing a mask around others for the next five days,  3. At least 24 have passed since last fever without the use of fever-reducing medications and  4.  Symptoms (eg cough, shortness of breath) have improved      Sincerely,          Amanda Farmer, DO

## 2022-01-26 ENCOUNTER — PATIENT OUTREACH (OUTPATIENT)
Dept: CASE MANAGEMENT | Age: 52
End: 2022-01-26

## 2022-01-26 LAB
SARS-COV-2, XPLCVT: DETECTED
SOURCE, COVRS: ABNORMAL

## 2022-01-26 NOTE — PROGRESS NOTES
Patient resolved from Transition of Care episode on 1/26/22. ACM/CTN was unsuccessful at contacting this patient today. Patient/family was provided the following resources and education related to COVID-19 during the initial call:                         Signs, symptoms and red flags related to COVID-19            CDC exposure and quarantine guidelines            Conduit exposure contact - [phone]            Contact for their local Department of Health                 Patient has not had any additional ED or hospital visits. No further outreach scheduled with this CTN/ACM. Episode of Care resolved. Patient has this CTN/ACM contact information if future needs arise.

## 2022-02-10 ENCOUNTER — TELEPHONE (OUTPATIENT)
Dept: INTERNAL MEDICINE CLINIC | Age: 52
End: 2022-02-10

## 2022-02-10 RX ORDER — HYDROXYZINE 25 MG/1
25 TABLET, FILM COATED ORAL
Qty: 30 TABLET | Refills: 0 | Status: SHIPPED | OUTPATIENT
Start: 2022-02-10 | End: 2022-02-20

## 2022-02-10 NOTE — TELEPHONE ENCOUNTER
----- Message from Teresa Barakat sent at 2/9/2022  4:18 PM EST -----  Subject: Refill Request    QUESTIONS  Name of Medication? hydrOXYzine HCL (ATARAX) 25 mg tablet  Patient-reported dosage and instructions? take 1 a day   How many days do you have left? 0  Preferred Pharmacy? [id number]  Pharmacy phone number (if available)? [phone]  Additional Information for Provider? Patient has an appointment on 4/1 but   is out of her medication and is wanting to know if she can get that filled   before then. If not can she get an earlier appt.   ---------------------------------------------------------------------------  --------------  CALL BACK INFO  What is the best way for the office to contact you? OK to leave message on   voicemail  Preferred Call Back Phone Number?  [phone]

## 2022-03-18 PROBLEM — E55.9 VITAMIN D DEFICIENCY: Status: ACTIVE | Noted: 2021-01-25

## 2022-03-18 PROBLEM — N92.1 IRREGULAR INTERMENSTRUAL BLEEDING: Status: ACTIVE | Noted: 2020-10-13

## 2022-03-18 PROBLEM — N94.6 DYSMENORRHEA: Status: ACTIVE | Noted: 2020-10-13

## 2022-03-19 PROBLEM — F41.0 ANXIETY ATTACK: Status: ACTIVE | Noted: 2021-01-25

## 2022-03-19 PROBLEM — I10 ESSENTIAL HYPERTENSION: Status: ACTIVE | Noted: 2021-01-25

## 2022-03-19 PROBLEM — R93.0 ABNORMAL FINDINGS ON DIAGNOSTIC IMAGING OF SKULL AND HEAD, NOT ELSEWHERE CLASSIFIED: Status: ACTIVE | Noted: 2020-10-13

## 2022-03-19 PROBLEM — K21.9 GASTROESOPHAGEAL REFLUX DISEASE WITHOUT ESOPHAGITIS: Status: ACTIVE | Noted: 2021-01-25

## 2022-03-20 PROBLEM — Z12.11 SCREENING FOR COLON CANCER: Status: ACTIVE | Noted: 2022-03-20

## 2022-03-20 PROBLEM — Z13.220 SCREENING CHOLESTEROL LEVEL: Status: ACTIVE | Noted: 2022-03-20

## 2022-03-20 PROBLEM — Z12.31 SCREENING MAMMOGRAM FOR BREAST CANCER: Status: ACTIVE | Noted: 2022-03-20

## 2022-03-20 PROBLEM — Z11.59 NEED FOR HEPATITIS C SCREENING TEST: Status: ACTIVE | Noted: 2022-03-20

## 2022-03-20 PROBLEM — R74.01 ELEVATED ALT MEASUREMENT: Status: ACTIVE | Noted: 2022-03-20

## 2022-03-24 PROBLEM — Z12.11 SCREENING FOR COLON CANCER: Status: ACTIVE | Noted: 2022-03-20

## 2022-03-24 PROBLEM — Z13.220 SCREENING CHOLESTEROL LEVEL: Status: ACTIVE | Noted: 2022-03-20

## 2022-03-24 PROBLEM — Z12.31 SCREENING MAMMOGRAM FOR BREAST CANCER: Status: ACTIVE | Noted: 2022-03-20

## 2022-03-24 PROBLEM — Z11.59 NEED FOR HEPATITIS C SCREENING TEST: Status: ACTIVE | Noted: 2022-03-20

## 2022-03-24 PROBLEM — R74.01 ELEVATED ALT MEASUREMENT: Status: ACTIVE | Noted: 2022-03-20

## 2022-04-01 ENCOUNTER — OFFICE VISIT (OUTPATIENT)
Dept: INTERNAL MEDICINE CLINIC | Age: 52
End: 2022-04-01
Payer: COMMERCIAL

## 2022-04-01 VITALS
DIASTOLIC BLOOD PRESSURE: 80 MMHG | TEMPERATURE: 96.6 F | HEART RATE: 60 BPM | BODY MASS INDEX: 29.11 KG/M2 | WEIGHT: 154.2 LBS | RESPIRATION RATE: 20 BRPM | HEIGHT: 61 IN | OXYGEN SATURATION: 99 % | SYSTOLIC BLOOD PRESSURE: 142 MMHG

## 2022-04-01 DIAGNOSIS — Z13.220 SCREENING CHOLESTEROL LEVEL: ICD-10-CM

## 2022-04-01 DIAGNOSIS — R22.1 LUMP IN NECK: ICD-10-CM

## 2022-04-01 DIAGNOSIS — Z12.31 SCREENING MAMMOGRAM FOR BREAST CANCER: ICD-10-CM

## 2022-04-01 DIAGNOSIS — I10 ESSENTIAL HYPERTENSION: ICD-10-CM

## 2022-04-01 DIAGNOSIS — J30.9 ALLERGIC RHINITIS, UNSPECIFIED SEASONALITY, UNSPECIFIED TRIGGER: ICD-10-CM

## 2022-04-01 DIAGNOSIS — R74.01 ELEVATED ALT MEASUREMENT: ICD-10-CM

## 2022-04-01 DIAGNOSIS — E55.9 VITAMIN D DEFICIENCY: ICD-10-CM

## 2022-04-01 DIAGNOSIS — Z11.59 NEED FOR HEPATITIS C SCREENING TEST: ICD-10-CM

## 2022-04-01 DIAGNOSIS — Z12.11 SCREENING FOR COLON CANCER: ICD-10-CM

## 2022-04-01 PROCEDURE — 99214 OFFICE O/P EST MOD 30 MIN: CPT | Performed by: INTERNAL MEDICINE

## 2022-04-01 RX ORDER — FLUTICASONE PROPIONATE 50 MCG
2 SPRAY, SUSPENSION (ML) NASAL DAILY
Qty: 1 EACH | Refills: 2 | Status: SHIPPED | OUTPATIENT
Start: 2022-04-01 | End: 2022-04-04 | Stop reason: SDUPTHER

## 2022-04-01 RX ORDER — HYDROCHLOROTHIAZIDE 12.5 MG/1
12.5 TABLET ORAL EVERY MORNING
Qty: 90 TABLET | Refills: 2 | Status: SHIPPED | OUTPATIENT
Start: 2022-04-01 | End: 2022-04-04 | Stop reason: SDUPTHER

## 2022-04-01 RX ORDER — CETIRIZINE HCL 10 MG
10 TABLET ORAL DAILY
Qty: 90 TABLET | Refills: 1 | Status: SHIPPED | OUTPATIENT
Start: 2022-04-01 | End: 2022-04-04 | Stop reason: SDUPTHER

## 2022-04-01 NOTE — PROGRESS NOTES
Dominique Collado is a 49 y.o. female and presents with Follow Up Chronic Condition and Hypertension      Ms. Sandoval came for regular follow-up. She has history of mild hypertension. She has history of vitamin D deficiency. she came for follow-up after a long time. Today her blood pressure is on upper side of normal range started back on hydrochlorothiazide 12.5 mg/day and she needs  labs to be done to check her vitamin D level and electrolytes also. She has complaints of allergies started on cetirizine fluticasone nasal spray. She felt some small mobile lump in the left middle part of the neck. She has no pain. She is observing for last 4 months. She has no pain. No history of URI. It is not that much increasing in size. CT scan of the neck ordered. I checked axillary region there is no lymphadenopathy in axilla. I monitor her weight she has no major weight loss but she told me that she has history of weight loss. She told me that she does not have anxiety or depression. She does not have GERD. She says that she has not done mammogram.  She has history of complete hysterectomy. She has not done Cologuard or colonoscopy opted out for Cologuard wants to do colonoscopy. She had a history of elevated ALT in the past.  No fever. No chills. No history of smoking cigarette. No history of substance abuse. Review of Systems    Review of Systems   Constitutional: Negative. HENT: Negative for sinus pain and sore throat. Eyes: Negative for blurred vision. Respiratory: Negative for cough and wheezing. Cardiovascular: Negative. Gastrointestinal: Negative. Genitourinary: Negative. Musculoskeletal: Negative. Neurological: Negative. Endo/Heme/Allergies:        Small palpable nonfixed swelling in lt neck/   Psychiatric/Behavioral: Negative.          Past Medical History:   Diagnosis Date    Abnormal findings on diagnostic imaging of skull and head, not elsewhere classified 10/13/2020  Dysmenorrhea 10/13/2020    Essential hypertension 1/25/2021    GERD (gastroesophageal reflux disease)     Irregular intermenstrual bleeding 10/13/2020     Past Surgical History:   Procedure Laterality Date    HX GYN      HX PARTIAL HYSTERECTOMY  05/12/2012     Social History     Socioeconomic History    Marital status: SINGLE   Tobacco Use    Smoking status: Never Smoker    Smokeless tobacco: Never Used   Substance and Sexual Activity    Alcohol use: Not Currently    Drug use: Never     Family History   Problem Relation Age of Onset    Hypertension Mother     Cancer Mother     Lung Cancer Mother     Hypertension Father     Cancer Father     Pancreatic Cancer Father      Current Outpatient Medications   Medication Sig Dispense Refill    hydroCHLOROthiazide (HYDRODIURIL) 12.5 mg tablet Take 1 Tablet by mouth Every morning. 90 Tablet 2    cetirizine (ZYRTEC) 10 mg tablet Take 1 Tablet by mouth daily. 90 Tablet 1    fluticasone propionate (FLONASE) 50 mcg/actuation nasal spray 2 Sprays by Both Nostrils route daily. 1 Each 2     Allergies   Allergen Reactions    Penicillins Rash     ALSO ALLERGIC SEAFOOD  HIVES       Objective:  Visit Vitals  BP (!) 142/80 (BP 1 Location: Left upper arm)   Pulse 60   Temp (!) 96.6 °F (35.9 °C) (Temporal)   Resp 20   Ht 5' 1\" (1.549 m)   Wt 154 lb 3.2 oz (69.9 kg)   SpO2 99%   BMI 29.14 kg/m²       Physical Exam:   Constitutional: General Appearance: Age-appropriate hypertension. Level of Distress: NAD. Psychiatric: Mental Status: normal mood and affect Orientation: to time, place, and person. ,normal eye contact. Head: Head: normocephalic and atraumatic. Eyes: Pupils: PERRLA. Sclerae: non-icteric. Neck: Neck: supple, trachea midline, and no masses. Lymph Nodes: Single movable swelling/lymph node palpable in left side of the neck no axillary lymphadenopathy palpable. . Thyroid: no enlargement or nodules and non-tender. Lungs: Respiratory effort: no dyspnea. Auscultation: no wheezing, rales/crackles, or rhonchi and breath sounds normal and good air movement. Cardiovascular: Apical Impulse: not displaced. Heart Auscultation: normal S1 and S2; no murmurs, rubs, or gallops; and RRR. Neck vessels: no carotid bruits. Pulses including femoral / pedal: normal throughout. Abdomen: Bowel Sounds: normal. Inspection and Palpation: no tenderness, guarding, or masses and soft and non-distended. Liver: non-tender and no hepatomegaly. Spleen: non-tender and no splenomegaly. Musculoskeletal[de-identified] Extremities: no edema,no varicosities. No Calf tenderness. Neurologic: Gait and Station: normal gait and station. Motor Strength normal right and left. Skin: Inspection and palpation: no rash, lesions, or ulcer. Results for orders placed or performed during the hospital encounter of 01/25/22   SARS-COV-2   Result Value Ref Range    SARS-CoV-2 by PCR Please find results under separate order     SARS-COV-2   Result Value Ref Range    Specimen source Please find results under separate order      SARS-CoV-2 Detected (A) Not detected       Assessment/Plan:      ICD-10-CM ICD-9-CM    1. Essential hypertension  I10 401.9 CBC WITH AUTOMATED DIFF      METABOLIC PANEL, COMPREHENSIVE      TSH 3RD GENERATION   2. Vitamin D deficiency  E55.9 268.9 VITAMIN D, 25 HYDROXY   3. Lump in neck  R22.1 784.2 CT NECK SOFT TISSUE W CONT   4. Elevated ALT measurement  M02.26 097.0 METABOLIC PANEL, COMPREHENSIVE   5. Allergic rhinitis, unspecified seasonality, unspecified trigger  J30.9 477.9    6. Need for hepatitis C screening test  Z11.59 V73.89 HEPATITIS C AB   7. Screening mammogram for breast cancer  Z12.31 V76.12 ALL 3D LEXIE W MAMMO BI SCREENING INCL CAD   8.  Screening for colon cancer  Z12.11 V76.51 REFERRAL TO GASTROENTEROLOGY   9. Screening cholesterol level  Z13.220 V77.91 LIPID PANEL     Orders Placed This Encounter    ALL 3D LEXIE W MAMMO BI SCREENING INCL CAD     Standing Status:   Future Standing Expiration Date:   2022     Order Specific Question:   Is Patient Pregnant? Answer:   No    CT NECK SOFT TISSUE W CONT     Standing Status:   Future     Standing Expiration Date:   2022     Scheduling Instructions:      Single mobile swelling in lt side of neck for 4 months . she noticed r/o other serious pathology     Order Specific Question:   Is Patient Pregnant? Answer:   No     Order Specific Question:   STAT Creatinine as indicated     Answer:   Yes     Order Specific Question:   Reason for Exam     Answer:   David Holt lt side neck for 4 months    CBC WITH AUTOMATED DIFF    METABOLIC PANEL, COMPREHENSIVE    LIPID PANEL    TSH 3RD GENERATION    HEPATITIS C AB    VITAMIN D, 25 HYDROXY    REFERRAL TO GASTROENTEROLOGY     Referral Priority:   Routine     Referral Type:   Consultation     Referral Reason:   Specialty Services Required     Referred to Provider:   Vicky Salas MD     Requested Specialty:   Gastroenterology     Number of Visits Requested:   1    hydroCHLOROthiazide (HYDRODIURIL) 12.5 mg tablet     Sig: Take 1 Tablet by mouth Every morning. Dispense:  90 Tablet     Refill:  2    cetirizine (ZYRTEC) 10 mg tablet     Sig: Take 1 Tablet by mouth daily. Dispense:  90 Tablet     Refill:  1    fluticasone propionate (FLONASE) 50 mcg/actuation nasal spray     Si Sprays by Both Nostrils route daily. Dispense:  1 Each     Refill:  2     Hypertension labs ordered started back on hydrochlorothiazide 12.5 mg/day slightly on upper side of normal range she has not ran out of hydrochlorothiazide Baseline labs ordered to monitor electrolytes renal function and potassium. DASH diet. Vitamin D deficiency labs ordered in order to determine the dose of vitamin D. She agreed. She should take vitamin D rich diet. She feels small lump in the left side of the neck. I palpated. It is not fixed. It is not painful.   I will order a CT scan of the neck without contrast to rule out any other lymphadenopathy. If she has then I will refer her to medical oncologist and she agreed. She has no history of fever chills she told me she has history of weight loss but I monitor she does not have that much weight loss. No history of smoking cigarette. No history of chewing tobacco.  No history of night sweating. I told her to have close monitoring. I did not palpate any axillary lymphadenopathy. On the contrary she has weight gain when compared to January 2021. Screening mammogram ordered. Referral to gastroenterologist for screening colon cancer. She told me now she has no GERD and no anxiety or depression. Elevated ALT CMP ordered. She wants to see me in 6 months. .  I had told her that if needed I might refer her to specialist.    Refills given. lose weight, increase physical activity, limit alcohol consumption, follow low fat diet, follow low salt diet, continue present plan, routine labs ordered, call if any problems    There are no Patient Instructions on file for this visit. Follow-up and Dispositions    · Return in about 3 months (around 7/1/2022).

## 2022-04-01 NOTE — PROGRESS NOTES
1. \"Have you been to the ER, urgent care clinic since your last visit? Hospitalized since your last visit? \" Yes When: [id number] Where: urgent care [address] Reason for visit: severe headache    2. \"Have you seen or consulted any other health care providers outside of the [address] [place] [address] since your last visit? \" No     3. For patients aged 39-70: Has the patient had a colonoscopy / FIT/ Cologuard? No      If the patient is female:    4. For patients aged 41-77: Has the patient had a mammogram within the past 2 years? No      5. For patients aged 21-65: Has the patient had a pap smear? Yes - Care Gap present.  Rooming MA/LPN to request most recent results [facility] 2021    Visit Vitals  /81 (BP 1 Location: Left arm)   Pulse (!) 53   Temp (!) 96.6 °F (35.9 °C) (Temporal)   Resp 20   Ht 5' 1\" (1.549 m)   Wt 154 lb 3.2 oz (69.9 kg)   SpO2 99%   BMI 29.14 kg/m²       Chief Complaint   Patient presents with    Follow Up Chronic Condition    Hypertension

## 2022-04-02 LAB
25(OH)D3+25(OH)D2 SERPL-MCNC: 17.7 NG/ML (ref 30–100)
ALBUMIN SERPL-MCNC: 4.7 G/DL (ref 3.8–4.8)
ALBUMIN/GLOB SERPL: 1.3 {RATIO} (ref 1.2–2.2)
ALP SERPL-CCNC: 69 IU/L (ref 44–121)
ALT SERPL-CCNC: 27 IU/L (ref 0–32)
AST SERPL-CCNC: 40 IU/L (ref 0–40)
BASOPHILS # BLD AUTO: 0.1 X10E3/UL (ref 0–0.2)
BASOPHILS NFR BLD AUTO: 1 %
BILIRUB SERPL-MCNC: 0.5 MG/DL (ref 0–1.2)
BUN SERPL-MCNC: 5 MG/DL (ref 6–24)
BUN/CREAT SERPL: 6 (ref 9–23)
CALCIUM SERPL-MCNC: 10.2 MG/DL (ref 8.7–10.2)
CHLORIDE SERPL-SCNC: 102 MMOL/L (ref 96–106)
CHOLEST SERPL-MCNC: 253 MG/DL (ref 100–199)
CO2 SERPL-SCNC: 21 MMOL/L (ref 20–29)
CREAT SERPL-MCNC: 0.88 MG/DL (ref 0.57–1)
EGFR: 80 ML/MIN/1.73
EOSINOPHIL # BLD AUTO: 0.1 X10E3/UL (ref 0–0.4)
EOSINOPHIL NFR BLD AUTO: 2 %
ERYTHROCYTE [DISTWIDTH] IN BLOOD BY AUTOMATED COUNT: 11.9 % (ref 11.7–15.4)
GLOBULIN SER CALC-MCNC: 3.5 G/DL (ref 1.5–4.5)
GLUCOSE SERPL-MCNC: 78 MG/DL (ref 65–99)
HCT VFR BLD AUTO: 37.3 % (ref 34–46.6)
HCV AB S/CO SERPL IA: <0.1 S/CO RATIO (ref 0–0.9)
HDLC SERPL-MCNC: 76 MG/DL
HGB BLD-MCNC: 13 G/DL (ref 11.1–15.9)
IMM GRANULOCYTES # BLD AUTO: 0 X10E3/UL (ref 0–0.1)
IMM GRANULOCYTES NFR BLD AUTO: 0 %
LDLC SERPL CALC-MCNC: 162 MG/DL (ref 0–99)
LYMPHOCYTES # BLD AUTO: 2.3 X10E3/UL (ref 0.7–3.1)
LYMPHOCYTES NFR BLD AUTO: 42 %
MCH RBC QN AUTO: 29.8 PG (ref 26.6–33)
MCHC RBC AUTO-ENTMCNC: 34.9 G/DL (ref 31.5–35.7)
MCV RBC AUTO: 86 FL (ref 79–97)
MONOCYTES # BLD AUTO: 0.6 X10E3/UL (ref 0.1–0.9)
MONOCYTES NFR BLD AUTO: 11 %
NEUTROPHILS # BLD AUTO: 2.4 X10E3/UL (ref 1.4–7)
NEUTROPHILS NFR BLD AUTO: 44 %
PLATELET # BLD AUTO: 271 X10E3/UL (ref 150–450)
POTASSIUM SERPL-SCNC: 4 MMOL/L (ref 3.5–5.2)
PROT SERPL-MCNC: 8.2 G/DL (ref 6–8.5)
RBC # BLD AUTO: 4.36 X10E6/UL (ref 3.77–5.28)
SODIUM SERPL-SCNC: 140 MMOL/L (ref 134–144)
TRIGL SERPL-MCNC: 88 MG/DL (ref 0–149)
TSH SERPL DL<=0.005 MIU/L-ACNC: 1.07 UIU/ML (ref 0.45–4.5)
VLDLC SERPL CALC-MCNC: 15 MG/DL (ref 5–40)
WBC # BLD AUTO: 5.5 X10E3/UL (ref 3.4–10.8)

## 2022-04-02 RX ORDER — ERGOCALCIFEROL 1.25 MG/1
50000 CAPSULE ORAL
Qty: 12 CAPSULE | Refills: 0 | Status: SHIPPED | OUTPATIENT
Start: 2022-04-02 | End: 2022-04-04 | Stop reason: SDUPTHER

## 2022-04-02 NOTE — PROGRESS NOTES
Please call patient that her cholesterol readings are very high she needs medication atorvastatin 10 mg at bedtime to prevent complications from high cholesterol leading to heart disease and stroke. She has to  increase the intake of protein in the diet because protein is  less. BUN is less and she needs muscle strengthening exercise. Let me know about cholesterol medicine then we can prescribe. I am sending vitamin D once a week for 3 months after 3 months she has to take over-the-counter vitamin D3 2000 unit/day and she has no history of kidney stones or calcium 600 mg/dayScreening for hep C is negative.

## 2022-04-04 RX ORDER — ERGOCALCIFEROL 1.25 MG/1
50000 CAPSULE ORAL
Qty: 12 CAPSULE | Refills: 0 | Status: SHIPPED | OUTPATIENT
Start: 2022-04-04 | End: 2022-06-24

## 2022-04-04 RX ORDER — FLUTICASONE PROPIONATE 50 MCG
2 SPRAY, SUSPENSION (ML) NASAL DAILY
Qty: 1 EACH | Refills: 2 | Status: SHIPPED | OUTPATIENT
Start: 2022-04-04

## 2022-04-04 RX ORDER — CETIRIZINE HCL 10 MG
10 TABLET ORAL DAILY
Qty: 90 TABLET | Refills: 1 | Status: SHIPPED | OUTPATIENT
Start: 2022-04-04

## 2022-04-04 RX ORDER — HYDROCHLOROTHIAZIDE 12.5 MG/1
12.5 TABLET ORAL EVERY MORNING
Qty: 90 TABLET | Refills: 3 | Status: SHIPPED | OUTPATIENT
Start: 2022-04-04 | End: 2022-10-05 | Stop reason: SDUPTHER

## 2022-04-15 ENCOUNTER — HOSPITAL ENCOUNTER (OUTPATIENT)
Dept: MAMMOGRAPHY | Age: 52
Discharge: HOME | End: 2022-04-15
Attending: INTERNAL MEDICINE
Payer: COMMERCIAL

## 2022-04-15 DIAGNOSIS — Z12.31 SCREENING MAMMOGRAM FOR BREAST CANCER: ICD-10-CM

## 2022-04-15 PROCEDURE — 77063 BREAST TOMOSYNTHESIS BI: CPT

## 2022-04-19 PROBLEM — Z11.59 NEED FOR HEPATITIS C SCREENING TEST: Status: RESOLVED | Noted: 2022-03-20 | Resolved: 2022-04-19

## 2022-04-19 PROBLEM — Z13.220 SCREENING CHOLESTEROL LEVEL: Status: RESOLVED | Noted: 2022-03-20 | Resolved: 2022-04-19

## 2022-05-19 ENCOUNTER — ANESTHESIA EVENT (OUTPATIENT)
Dept: ENDOSCOPY | Age: 52
End: 2022-05-19
Payer: COMMERCIAL

## 2022-05-19 ENCOUNTER — HOSPITAL ENCOUNTER (OUTPATIENT)
Age: 52
Setting detail: OUTPATIENT SURGERY
Discharge: HOME | End: 2022-05-19
Attending: INTERNAL MEDICINE | Admitting: INTERNAL MEDICINE
Payer: COMMERCIAL

## 2022-05-19 ENCOUNTER — ANESTHESIA (OUTPATIENT)
Dept: ENDOSCOPY | Age: 52
End: 2022-05-19
Payer: COMMERCIAL

## 2022-05-19 ENCOUNTER — APPOINTMENT (OUTPATIENT)
Dept: ENDOSCOPY | Age: 52
End: 2022-05-19
Attending: INTERNAL MEDICINE
Payer: COMMERCIAL

## 2022-05-19 VITALS
BODY MASS INDEX: 27.38 KG/M2 | HEIGHT: 61 IN | OXYGEN SATURATION: 100 % | WEIGHT: 145 LBS | SYSTOLIC BLOOD PRESSURE: 115 MMHG | HEART RATE: 51 BPM | RESPIRATION RATE: 16 BRPM | TEMPERATURE: 97.6 F | DIASTOLIC BLOOD PRESSURE: 81 MMHG

## 2022-05-19 PROCEDURE — 88305 TISSUE EXAM BY PATHOLOGIST: CPT

## 2022-05-19 PROCEDURE — 74011250636 HC RX REV CODE- 250/636: Performed by: INTERNAL MEDICINE

## 2022-05-19 PROCEDURE — 77030019988 HC FCPS ENDOSC DISP BSC -B: Performed by: INTERNAL MEDICINE

## 2022-05-19 PROCEDURE — 74011000250 HC RX REV CODE- 250: Performed by: NURSE ANESTHETIST, CERTIFIED REGISTERED

## 2022-05-19 PROCEDURE — 2709999900 HC NON-CHARGEABLE SUPPLY: Performed by: INTERNAL MEDICINE

## 2022-05-19 PROCEDURE — 76060000032 HC ANESTHESIA 0.5 TO 1 HR: Performed by: INTERNAL MEDICINE

## 2022-05-19 PROCEDURE — 74011250636 HC RX REV CODE- 250/636: Performed by: NURSE ANESTHETIST, CERTIFIED REGISTERED

## 2022-05-19 PROCEDURE — 76040000007: Performed by: INTERNAL MEDICINE

## 2022-05-19 RX ORDER — PROPOFOL 10 MG/ML
INJECTION, EMULSION INTRAVENOUS AS NEEDED
Status: DISCONTINUED | OUTPATIENT
Start: 2022-05-19 | End: 2022-05-19 | Stop reason: HOSPADM

## 2022-05-19 RX ORDER — LIDOCAINE HYDROCHLORIDE 20 MG/ML
INJECTION, SOLUTION EPIDURAL; INFILTRATION; INTRACAUDAL; PERINEURAL AS NEEDED
Status: DISCONTINUED | OUTPATIENT
Start: 2022-05-19 | End: 2022-05-19 | Stop reason: HOSPADM

## 2022-05-19 RX ORDER — SODIUM CHLORIDE, SODIUM LACTATE, POTASSIUM CHLORIDE, CALCIUM CHLORIDE 600; 310; 30; 20 MG/100ML; MG/100ML; MG/100ML; MG/100ML
50 INJECTION, SOLUTION INTRAVENOUS CONTINUOUS
Status: DISCONTINUED | OUTPATIENT
Start: 2022-05-19 | End: 2022-05-19 | Stop reason: HOSPADM

## 2022-05-19 RX ORDER — SODIUM CHLORIDE, SODIUM LACTATE, POTASSIUM CHLORIDE, CALCIUM CHLORIDE 600; 310; 30; 20 MG/100ML; MG/100ML; MG/100ML; MG/100ML
INJECTION, SOLUTION INTRAVENOUS
Status: DISCONTINUED | OUTPATIENT
Start: 2022-05-19 | End: 2022-05-19 | Stop reason: HOSPADM

## 2022-05-19 RX ADMIN — SODIUM CHLORIDE, POTASSIUM CHLORIDE, SODIUM LACTATE AND CALCIUM CHLORIDE: 600; 310; 30; 20 INJECTION, SOLUTION INTRAVENOUS at 10:39

## 2022-05-19 RX ADMIN — PROPOFOL 90 MG: 10 INJECTION, EMULSION INTRAVENOUS at 10:39

## 2022-05-19 RX ADMIN — SODIUM CHLORIDE, POTASSIUM CHLORIDE, SODIUM LACTATE AND CALCIUM CHLORIDE 50 ML/HR: 600; 310; 30; 20 INJECTION, SOLUTION INTRAVENOUS at 10:05

## 2022-05-19 RX ADMIN — LIDOCAINE HYDROCHLORIDE 80 MG: 20 INJECTION, SOLUTION EPIDURAL; INFILTRATION; INTRACAUDAL; PERINEURAL at 10:37

## 2022-05-19 NOTE — DISCHARGE INSTRUCTIONS
Any issue with procedure, such as pain, bleeding, fever, please call my office at daytime or call [phone] to page me or go to ER at other time. Continue present treatment. Follow-up biopsy report. Repeat colonoscopy in 5 years for polyps surveillance.

## 2022-05-19 NOTE — PERIOP NOTES
Patient alert and oriented x4, VS stable, no complaints of pain at this time. No one at bedside, son needs to be called after procedure. Bed in low position, call bell within reach.

## 2022-05-19 NOTE — ANESTHESIA POSTPROCEDURE EVALUATION
Procedure(s):  COLONOSCOPY  COLON BIOPSY. total IV anesthesia, MAC    Anesthesia Post Evaluation      Multimodal analgesia: multimodal analgesia not used between 6 hours prior to anesthesia start to PACU discharge  Patient location during evaluation: bedside  Patient participation: complete - patient participated  Level of consciousness: lethargic  Pain score: 0  Airway patency: patent  Anesthetic complications: no  Cardiovascular status: acceptable  Respiratory status: acceptable  Hydration status: acceptable  Comments: Remains on stretcher; report to rn  Post anesthesia nausea and vomiting:  none  Final Post Anesthesia Temperature Assessment:  Normothermia (36.0-37.5 degrees C)      INITIAL Post-op Vital signs: No vitals data found for the desired time range.

## 2022-05-19 NOTE — ANESTHESIA PREPROCEDURE EVALUATION
Relevant Problems   CARDIOVASCULAR   (+) Essential hypertension      GASTROINTESTINAL   (+) Gastroesophageal reflux disease without esophagitis       Anesthetic History   No history of anesthetic complications            Review of Systems / Medical History  Patient summary reviewed, nursing notes reviewed and pertinent labs reviewed    Pulmonary            Asthma        Neuro/Psych   Within defined limits           Cardiovascular    Hypertension                   GI/Hepatic/Renal     GERD           Endo/Other  Within defined limits           Other Findings   Comments:     Procedure Information    Case: [id number] Date/Time: 05/19/22 1010  Procedure: COLONOSCOPY (N/A )  Anesthesia type: MAC  Pre-op diagnosis: COLON CANCER SCREENING  Location: [facility] / [facility]  Providers: David Cristina MD              Medical History  GERD (gastroesophageal reflux disease)  Dysmenorrhea  Abnormal findings on diagnostic imaging of skull and head, not elsewhere classified  Irregular intermenstrual bleeding  Essential hypertension           Physical Exam    Airway  Mallampati: II  TM Distance: 4 - 6 cm  Neck ROM: normal range of motion   Mouth opening: Normal     Cardiovascular    Rhythm: regular  Rate: normal         Dental  No notable dental hx       Pulmonary  Breath sounds clear to auscultation               Abdominal  GI exam deferred       Other Findings            Anesthetic Plan    ASA: 2  Anesthesia type: total IV anesthesia and MAC          Induction: Intravenous  Anesthetic plan and risks discussed with: Patient

## 2022-05-19 NOTE — PERIOP NOTES
Patient alert and oriented x4, VS stable, no complaints of pain at this time. Discharge instructions/education provided, patient verbalized understanding and had no questions. Patient discharged to home with son in wheelchair at main entrance of hospital via private vehicle.

## 2022-06-07 ENCOUNTER — APPOINTMENT (OUTPATIENT)
Dept: GENERAL RADIOLOGY | Age: 52
End: 2022-06-07
Attending: NURSE PRACTITIONER
Payer: COMMERCIAL

## 2022-06-07 ENCOUNTER — HOSPITAL ENCOUNTER (EMERGENCY)
Age: 52
Discharge: HOME | End: 2022-06-07
Attending: STUDENT IN AN ORGANIZED HEALTH CARE EDUCATION/TRAINING PROGRAM
Payer: COMMERCIAL

## 2022-06-07 VITALS
HEIGHT: 61 IN | TEMPERATURE: 98.6 F | SYSTOLIC BLOOD PRESSURE: 121 MMHG | WEIGHT: 140 LBS | RESPIRATION RATE: 18 BRPM | HEART RATE: 69 BPM | DIASTOLIC BLOOD PRESSURE: 84 MMHG | OXYGEN SATURATION: 99 % | BODY MASS INDEX: 26.43 KG/M2

## 2022-06-07 DIAGNOSIS — Z87.09 HISTORY OF ASTHMA: ICD-10-CM

## 2022-06-07 DIAGNOSIS — R05.9 COUGH: Primary | ICD-10-CM

## 2022-06-07 PROCEDURE — 99284 EMERGENCY DEPT VISIT MOD MDM: CPT

## 2022-06-07 PROCEDURE — 74011250637 HC RX REV CODE- 250/637: Performed by: NURSE PRACTITIONER

## 2022-06-07 PROCEDURE — 71046 X-RAY EXAM CHEST 2 VIEWS: CPT

## 2022-06-07 PROCEDURE — 74011636637 HC RX REV CODE- 636/637: Performed by: NURSE PRACTITIONER

## 2022-06-07 PROCEDURE — 93005 ELECTROCARDIOGRAM TRACING: CPT

## 2022-06-07 PROCEDURE — 94640 AIRWAY INHALATION TREATMENT: CPT

## 2022-06-07 RX ORDER — ALBUTEROL SULFATE 90 UG/1
2 AEROSOL, METERED RESPIRATORY (INHALATION)
Status: COMPLETED | OUTPATIENT
Start: 2022-06-07 | End: 2022-06-07

## 2022-06-07 RX ORDER — PREDNISONE 20 MG/1
40 TABLET ORAL
Status: DISCONTINUED | OUTPATIENT
Start: 2022-06-08 | End: 2022-06-07

## 2022-06-07 RX ORDER — ALBUTEROL SULFATE 90 UG/1
2 AEROSOL, METERED RESPIRATORY (INHALATION)
Qty: 18 G | Refills: 0 | Status: SHIPPED | OUTPATIENT
Start: 2022-06-07

## 2022-06-07 RX ORDER — PREDNISONE 10 MG/1
TABLET ORAL
Qty: 21 TABLET | Refills: 0 | Status: SHIPPED | OUTPATIENT
Start: 2022-06-07 | End: 2022-10-05 | Stop reason: ALTCHOICE

## 2022-06-07 RX ORDER — PREDNISONE 20 MG/1
40 TABLET ORAL ONCE
Status: COMPLETED | OUTPATIENT
Start: 2022-06-07 | End: 2022-06-07

## 2022-06-07 RX ORDER — DOXYCYCLINE HYCLATE 100 MG
100 TABLET ORAL 2 TIMES DAILY
Qty: 14 TABLET | Refills: 0 | Status: SHIPPED | OUTPATIENT
Start: 2022-06-07 | End: 2022-06-07 | Stop reason: ALTCHOICE

## 2022-06-07 RX ADMIN — PREDNISONE 40 MG: 20 TABLET ORAL at 16:13

## 2022-06-07 RX ADMIN — ALBUTEROL SULFATE 2 PUFF: 108 AEROSOL, METERED RESPIRATORY (INHALATION) at 16:14

## 2022-06-07 NOTE — ED PROVIDER NOTES
EMERGENCY DEPARTMENT HISTORY AND PHYSICAL EXAM      Date: 6/7/2022  Patient Name: Camille Rosa    History of Presenting Illness     Chief Complaint   Patient presents with    Cough    Nasal Congestion    Shortness of Breath       History Provided By: Patient    HPI: Camille Rosa, 49 y.o. female with a past medical history significant hypertension and asthma presents to the ED with cc of cough, congestion, exertional dyspnea. There are no other complaints, changes, or physical findings at this time. PCP: Kay Iqbal MD    No current facility-administered medications on file prior to encounter. Current Outpatient Medications on File Prior to Encounter   Medication Sig Dispense Refill    cetirizine (ZYRTEC) 10 mg tablet Take 1 Tablet by mouth daily. (Patient not taking: Reported on 5/19/2022) 90 Tablet 1    ergocalciferol (ERGOCALCIFEROL) 1,250 mcg (50,000 unit) capsule Take 1 Capsule by mouth every seven (7) days. 12 Capsule 0    fluticasone propionate (FLONASE) 50 mcg/actuation nasal spray 2 Sprays by Both Nostrils route daily. (Patient not taking: Reported on 5/19/2022) 1 Each 2    hydroCHLOROthiazide (HYDRODIURIL) 12.5 mg tablet Take 1 Tablet by mouth Every morning.  80 Tablet 3       Past History     Past Medical History:  Past Medical History:   Diagnosis Date    Abnormal findings on diagnostic imaging of skull and head, not elsewhere classified 10/13/2020    Dysmenorrhea 10/13/2020    Essential hypertension 1/25/2021    GERD (gastroesophageal reflux disease)     Irregular intermenstrual bleeding 10/13/2020       Past Surgical History:  Past Surgical History:   Procedure Laterality Date    COLONOSCOPY N/A 5/19/2022    COLONOSCOPY performed by Yue Crespo MD at [address] [place] [address] HX GYN      HX PARTIAL HYSTERECTOMY  05/12/2012       Family History:  Family History   Problem Relation Age of Onset    Hypertension Mother     Cancer Mother     Lung Cancer Mother     Hypertension Father     Cancer Father     Pancreatic Cancer Father        Social History:  Social History     Tobacco Use    Smoking status: Never Smoker    Smokeless tobacco: Never Used   Substance Use Topics    Alcohol use: Not Currently    Drug use: Never       Allergies: Allergies   Allergen Reactions    Penicillins Rash     ALSO ALLERGIC SEAFOOD  HIVES    Food [Shellfish Derived] Angioedema         Review of Systems     Review of Systems    Physical Exam     Physical Exam    Lab and Diagnostic Study Results     Labs -   No results found for this or any previous visit (from the past 12 hour(s)). Radiologic Studies -   @lastxrresult@  CT Results  (Last 48 hours)    None        CXR Results  (Last 48 hours)               06/07/22 1612  XR CHEST PA LAT Final result    Impression:  Normal study       Narrative:  2 view       Findings: The heart size is normal. Mediastinal structures appear normal.   Pulmonary vasculature is within normal limits. The lungs appear clear. No   pleural thickening or pleural effusion. Medical Decision Making   - I am the first provider for this patient. - I reviewed the vital signs, available nursing notes, past medical history, past surgical history, family history and social history. - Initial assessment performed. The patients presenting problems have been discussed, and they are in agreement with the care plan formulated and outlined with them. I have encouraged them to ask questions as they arise throughout their visit. Vital Signs-Reviewed the patient's vital signs.   Patient Vitals for the past 12 hrs:   Temp Pulse Resp BP SpO2   06/07/22 1548 98.6 °F (37 °C) 69 18 121/84 99 %       Records Reviewed: Nursing Notes, Old Medical Records and Previous Radiology Studies    The patient presents with cough, congestion, exertional dyspnea with a differential diagnosis of asthma exacerbation, bronchitis, viral URI, pneumonia, fluid overload      ED Course:     ED Course as of 06/07/22 2112 Tue Jun 07, 2022   1619 CXR unremarkable [KR]      ED Course User Index  [KR] Claudia De Leon NP       Provider Notes (Medical Decision Making):     MDM  Number of Diagnoses or Management Options  Cough: new, needed workup  History of asthma: new, needed workup     Amount and/or Complexity of Data Reviewed  Tests in the radiology section of CPT®: ordered and reviewed  Review and summarize past medical records: yes  Discuss the patient with other providers: yes  Independent visualization of images, tracings, or specimens: yes    Risk of Complications, Morbidity, and/or Mortality  Presenting problems: low    Patient Progress  Patient progress: improved           Disposition   Disposition: Condition stable and improved  DC- Adult Discharges: All of the diagnostic tests were reviewed and questions answered. Diagnosis, care plan and treatment options were discussed. The patient understands the instructions and will follow up as directed. The patients results have been reviewed with them. They have been counseled regarding their diagnosis. The patient verbally convey understanding and agreement of the signs, symptoms, diagnosis, treatment and prognosis and additionally agrees to follow up as recommended with their PCP in 24 - 48 hours. They also agree with the care-plan and convey that all of their questions have been answered. I have also put together some discharge instructions for them that include: 1) educational information regarding their diagnosis, 2) how to care for their diagnosis at home, as well a 3) list of reasons why they would want to return to the ED prior to their follow-up appointment, should their condition change. Discharged    DISCHARGE PLAN:  1. Current Discharge Medication List      CONTINUE these medications which have NOT CHANGED    Details   cetirizine (ZYRTEC) 10 mg tablet Take 1 Tablet by mouth daily.   Qty: 90 Tablet, Refills: 1      ergocalciferol (ERGOCALCIFEROL) 1,250 mcg (50,000 unit) capsule Take 1 Capsule by mouth every seven (7) days. Qty: 12 Capsule, Refills: 0      fluticasone propionate (FLONASE) 50 mcg/actuation nasal spray 2 Sprays by Both Nostrils route daily. Qty: 1 Each, Refills: 2      hydroCHLOROthiazide (HYDRODIURIL) 12.5 mg tablet Take 1 Tablet by mouth Every morning. Qty: 90 Tablet, Refills: 3           2. Follow-up Information     Follow up With Specialties Details Why Contact Info    Álvaro Huang MD Internal Medicine Physician In 3 days  [address]  [phone]      Follow up with primary care, urgent care, or this Emergency department   As needed, If symptoms worsen         3. Return to ED if worse   4. Discharge Medication List as of 6/7/2022  4:28 PM      START taking these medications    Details   albuterol (PROVENTIL HFA, VENTOLIN HFA, PROAIR HFA) 90 mcg/actuation inhaler Take 2 Puffs by inhalation every four to six (4-6) hours as needed for Wheezing., Normal, Disp-18 g, R-0      predniSONE (STERAPRED DS) 10 mg dose pack Take as directed, Normal, Disp-21 Tablet, R-0         CONTINUE these medications which have NOT CHANGED    Details   cetirizine (ZYRTEC) 10 mg tablet Take 1 Tablet by mouth daily. , Normal, Disp-90 Tablet, R-1      ergocalciferol (ERGOCALCIFEROL) 1,250 mcg (50,000 unit) capsule Take 1 Capsule by mouth every seven (7) days. , Normal, Disp-12 Capsule, R-0      fluticasone propionate (FLONASE) 50 mcg/actuation nasal spray 2 Sprays by Both Nostrils route daily. , Normal, Disp-1 Each, R-2      hydroCHLOROthiazide (HYDRODIURIL) 12.5 mg tablet Take 1 Tablet by mouth Every morning., Normal, Disp-90 Tablet, R-3               Diagnosis     Clinical Impression:   1. Cough    2. History of asthma        Attestations:    Andre Hernandez NP    Please note that this dictation was completed with [employer], the [employer] voice recognition software.   Quite often unanticipated grammatical, syntax, homophones, and other interpretive errors are inadvertently transcribed by the computer software. Please disregard these errors. Please excuse any errors that have escaped final proofreading. Thank you.      ---  I was personally available for consultation in the emergency department. The [facility] did not have questions for me or request to have me see the patient. I have reviewed the chart after the patient encounter had ended and agree with the documentation as recorded by the [facility], including the assessment, treatment plan, and disposition.     Susan Hines MD

## 2022-06-07 NOTE — ED TRIAGE NOTES
Pt has a dry cough and nasal congestion for 2  weeks, states she started having trouble breathing when going up the stairs today. Pt states she's taken mucinex, allergy and sinus medicine with no relief. Pt took a home covid test 2 days ago and it was negative.

## 2022-06-07 NOTE — Clinical Note
[facility] EMERGENCY DEPARTMENT  [address] [address]  [phone]    Work/School Note    Date: 6/7/2022    To Whom It May concern:    Elian Milan was seen and treated today in the emergency room by the following provider(s):  Attending Provider: Donna Huntley MD  Nurse Practitioner: Nida Loera NP. Elian Milan is excused from work/school on 06/07/22 and 06/08/22. She is medically clear to return to work/school on 6/9/2022.        Sincerely,          Janeth Corbin NP

## 2022-06-07 NOTE — Clinical Note
[facility] EMERGENCY DEPARTMENT  [address] [facility] [address]  [phone]    Work/School Note    Date: 6/7/2022    To Whom It May concern:      Sergio Thomas was seen and treated today in the emergency room by the following provider(s):  Attending Provider: Kenyon Nick MD  Nurse Practitioner: Lili Ward NP. Sergio Thomas is excused from work/school on 06/07/22. She is clear to return to work/school on 06/08/22.         Sincerely,          Tyrone Tong NP

## 2022-06-08 LAB
ATRIAL RATE: 65 BPM
CALCULATED P AXIS, ECG09: 29 DEGREES
CALCULATED R AXIS, ECG10: 30 DEGREES
CALCULATED T AXIS, ECG11: 11 DEGREES
DIAGNOSIS, 93000: NORMAL
P-R INTERVAL, ECG05: 158 MS
Q-T INTERVAL, ECG07: 396 MS
QRS DURATION, ECG06: 88 MS
QTC CALCULATION (BEZET), ECG08: 411 MS
VENTRICULAR RATE, ECG03: 65 BPM

## 2022-06-24 RX ORDER — ERGOCALCIFEROL 1.25 MG/1
50000 CAPSULE ORAL
Qty: 4 CAPSULE | Refills: 0 | Status: SHIPPED | OUTPATIENT
Start: 2022-06-24

## 2022-08-01 RX ORDER — ERGOCALCIFEROL 1.25 MG/1
50000 CAPSULE ORAL
Qty: 4 CAPSULE | Refills: 0 | OUTPATIENT
Start: 2022-08-01

## 2022-10-05 ENCOUNTER — OFFICE VISIT (OUTPATIENT)
Dept: INTERNAL MEDICINE CLINIC | Age: 52
End: 2022-10-05
Payer: COMMERCIAL

## 2022-10-05 VITALS
RESPIRATION RATE: 18 BRPM | WEIGHT: 149 LBS | OXYGEN SATURATION: 99 % | DIASTOLIC BLOOD PRESSURE: 80 MMHG | HEIGHT: 61 IN | HEART RATE: 72 BPM | SYSTOLIC BLOOD PRESSURE: 120 MMHG | BODY MASS INDEX: 28.13 KG/M2

## 2022-10-05 DIAGNOSIS — I10 ESSENTIAL HYPERTENSION: ICD-10-CM

## 2022-10-05 DIAGNOSIS — J45.20 MILD INTERMITTENT ASTHMA WITHOUT COMPLICATION: ICD-10-CM

## 2022-10-05 DIAGNOSIS — E55.9 VITAMIN D DEFICIENCY: ICD-10-CM

## 2022-10-05 DIAGNOSIS — E78.00 PURE HYPERCHOLESTEROLEMIA: ICD-10-CM

## 2022-10-05 DIAGNOSIS — Z12.31 SCREENING MAMMOGRAM FOR BREAST CANCER: ICD-10-CM

## 2022-10-05 DIAGNOSIS — Z01.419 ENCOUNTER FOR ANNUAL ROUTINE GYNECOLOGICAL EXAMINATION: ICD-10-CM

## 2022-10-05 DIAGNOSIS — K21.9 GASTROESOPHAGEAL REFLUX DISEASE WITHOUT ESOPHAGITIS: ICD-10-CM

## 2022-10-05 PROCEDURE — 99214 OFFICE O/P EST MOD 30 MIN: CPT | Performed by: INTERNAL MEDICINE

## 2022-10-05 RX ORDER — HYDROCHLOROTHIAZIDE 12.5 MG/1
12.5 TABLET ORAL EVERY MORNING
Qty: 90 TABLET | Refills: 2 | Status: SHIPPED | OUTPATIENT
Start: 2022-10-05

## 2022-10-05 RX ORDER — FAMOTIDINE 20 MG/1
20 TABLET, FILM COATED ORAL 2 TIMES DAILY
Qty: 60 TABLET | Refills: 2 | Status: SHIPPED | OUTPATIENT
Start: 2022-10-05

## 2022-10-05 NOTE — PROGRESS NOTES
Pa Collins (: 1970) is a 49 y.o. female, established patient, here for evaluation of the following chief complaint(s):  Follow Up Chronic Condition and Knee Swelling         ASSESSMENT/PLAN:  Below is the assessment and plan developed based on review of pertinent history, physical exam, labs, studies, and medications. 1. Essential hypertension  -     METABOLIC PANEL, COMPREHENSIVE  2. Pure hypercholesterolemia  -     LIPID PANEL  3. Vitamin D deficiency  -     VITAMIN D, 25 HYDROXY  4. Gastroesophageal reflux disease without esophagitis  5. Mild intermittent asthma without complication  6. Screening mammogram for breast cancer  7. Encounter for annual routine gynecological examination  -     REFERRAL TO OBSTETRICS AND GYNECOLOGY    Hypertension controlled. Continued hydrochlorothiazide 12.5 mg once a day. Diet low in sodium rich in potassium. Labs ordered. Hypercholesteremia. She does not want to be on statin in the past she says she has made changes in the diet. Still she eats fried food. Labs ordered. Continue exercise. Vitamin D level ordered. Mild intermittent asthma she is non-smoker. She takes albuterol inhaler only as needed. Allergic rhinitis she could not get cetirizine and fluticasone nasal spray by her insurance which was expensive than OTC so she takes OTC. GERD started on famotidine. No anxiety no depression. She visited emergency room on  and she was having some bronchitis and she was given rescue inhaler. Continue multivitamin and OTC vitamin D3. I stopped her high-dose vitamin D3. Refills given labs ordered. Education and counseling given. Follow-up in 6 months. Recommended to get Tdap or Td vaccine and and Shingrix vaccine and flu vaccine from the pharmacy. She has history of partial hysterectomy she needs an annual GYN checkup that I have referred.   She is up-to-date with her colonoscopy and mammogram.  Colonoscopy was showing polyp in ascending colon. Needs repeat colonoscopy in 5 years. Return in about 6 months (around 4/5/2023) for follow up for chronic condition. SUBJECTIVE/OBJECTIVE:    HPI:    Ms. Guevara came for regular follow-up. She has done her mammogram and colonoscopy. She is due for her blood work. Last blood work was done in April 2022 with vitamin D level 17.7 and lipid profile was elevated. She does not take any statin. She has slightly changed her diet to a little more healthy option. She is working. No depression. No anxiety. She takes OTC medicine for her symptoms of GERD. She needs refills. Reviewed her notes for the ER visit in June. Review of Systems   Constitutional: Negative. HENT:  Negative for sinus pain and sore throat. Eyes:  Negative for blurred vision. Respiratory:  Negative for cough and shortness of breath. Cardiovascular: Negative. Gastrointestinal: Negative. Genitourinary: Negative. Musculoskeletal: Negative. Neurological:  Negative for dizziness, tremors, sensory change and headaches. Psychiatric/Behavioral: Negative. Vitals:    10/05/22 0814 10/05/22 0836   BP: 126/87 120/80   Pulse: 72 72   Resp: 18    SpO2: 99%    Weight: 149 lb (67.6 kg)    Height: 5' 1\" (1.549 m)       Physical Exam  Vitals and nursing note reviewed. Constitutional:       General: She is not in acute distress. Appearance: Normal appearance. She is not toxic-appearing. Eyes:      Conjunctiva/sclera: Conjunctivae normal.      Pupils: Pupils are equal, round, and reactive to light. Cardiovascular:      Rate and Rhythm: Normal rate and regular rhythm. Pulses: Normal pulses. Heart sounds: Normal heart sounds. No murmur heard. Pulmonary:      Effort: Pulmonary effort is normal.      Breath sounds: Normal breath sounds. No wheezing or rhonchi. Abdominal:      General: Bowel sounds are normal.      Palpations: Abdomen is soft.       Tenderness: no abdominal tenderness There is no guarding. Musculoskeletal:         General: Normal range of motion. Cervical back: Normal range of motion and neck supple. Skin:     General: Skin is warm. Neurological:      General: No focal deficit present. Mental Status: She is oriented to person, place, and time. Mental status is at baseline. Psychiatric:         Mood and Affect: Mood normal.         Behavior: Behavior normal.       On this date 10/05/2022 I have spent 30 minutes reviewing previous notes, test results and face to face with the patient discussing the diagnosis and importance of compliance with the treatment plan as well as documenting on the day of the visit.     Signed by:  Sharron Oppenheim, MD

## 2022-10-06 LAB
25(OH)D3+25(OH)D2 SERPL-MCNC: 21.1 NG/ML (ref 30–100)
ALBUMIN SERPL-MCNC: 4.9 G/DL (ref 3.8–4.8)
ALBUMIN/GLOB SERPL: 1.4 {RATIO} (ref 1.2–2.2)
ALP SERPL-CCNC: 66 IU/L (ref 44–121)
ALT SERPL-CCNC: 31 IU/L (ref 0–32)
AST SERPL-CCNC: 31 IU/L (ref 0–40)
BILIRUB SERPL-MCNC: 0.6 MG/DL (ref 0–1.2)
BUN SERPL-MCNC: 7 MG/DL (ref 6–24)
BUN/CREAT SERPL: 8 (ref 9–23)
CALCIUM SERPL-MCNC: 10.5 MG/DL (ref 8.7–10.2)
CHLORIDE SERPL-SCNC: 97 MMOL/L (ref 96–106)
CHOLEST SERPL-MCNC: 326 MG/DL (ref 100–199)
CO2 SERPL-SCNC: 23 MMOL/L (ref 20–29)
CREAT SERPL-MCNC: 0.92 MG/DL (ref 0.57–1)
EGFR: 76 ML/MIN/1.73
GLOBULIN SER CALC-MCNC: 3.6 G/DL (ref 1.5–4.5)
GLUCOSE SERPL-MCNC: 94 MG/DL (ref 70–99)
HDLC SERPL-MCNC: 69 MG/DL
LDLC SERPL CALC-MCNC: 227 MG/DL (ref 0–99)
POTASSIUM SERPL-SCNC: 3.9 MMOL/L (ref 3.5–5.2)
PROT SERPL-MCNC: 8.5 G/DL (ref 6–8.5)
SODIUM SERPL-SCNC: 135 MMOL/L (ref 134–144)
TRIGL SERPL-MCNC: 162 MG/DL (ref 0–149)
VLDLC SERPL CALC-MCNC: 30 MG/DL (ref 5–40)

## 2022-10-06 RX ORDER — ATORVASTATIN CALCIUM 10 MG/1
20 TABLET, FILM COATED ORAL
Qty: 90 TABLET | Refills: 1 | Status: SHIPPED | OUTPATIENT
Start: 2022-10-06

## 2022-10-06 NOTE — PROGRESS NOTES
Please call Ms. Minnie Monaco who came yesterday and very young 73-year-old    Her cholesterol is very high. She is to be strict about her dietary discipline and dietary habits and stop eating junk and fast food containing a lot of fat and she is to read the food labels and please give her brochures for low-fat diet    I will recommend to be on atorvastatin 20 mg at bedtime and she needs to see me in 3 months and repeat the labs in 3 months    Her vitamin D level is improving and she should take over-the-counter vitamin D3 2000 units/day    Her calcium is slightly up so drink more water and if repeat the labs in next 3 months again to see her calcium level and she should prevent dehydration.     If she agree please let me know and you can create the prescription for atorvastatin

## 2022-11-04 PROBLEM — Z01.419 ENCOUNTER FOR ANNUAL ROUTINE GYNECOLOGICAL EXAMINATION: Status: RESOLVED | Noted: 2022-03-20 | Resolved: 2022-11-04

## 2023-02-21 ENCOUNTER — APPOINTMENT (OUTPATIENT)
Dept: GENERAL RADIOLOGY | Age: 53
End: 2023-02-21
Attending: PHYSICIAN ASSISTANT
Payer: MEDICAID

## 2023-02-21 ENCOUNTER — HOSPITAL ENCOUNTER (EMERGENCY)
Age: 53
Discharge: HOME | End: 2023-02-21
Payer: MEDICAID

## 2023-02-21 VITALS
HEIGHT: 62 IN | HEART RATE: 71 BPM | DIASTOLIC BLOOD PRESSURE: 82 MMHG | TEMPERATURE: 97.3 F | SYSTOLIC BLOOD PRESSURE: 129 MMHG | OXYGEN SATURATION: 100 % | RESPIRATION RATE: 16 BRPM | WEIGHT: 146 LBS | BODY MASS INDEX: 26.87 KG/M2

## 2023-02-21 DIAGNOSIS — M54.12 CERVICAL RADICULOPATHY: Primary | ICD-10-CM

## 2023-02-21 PROCEDURE — 96372 THER/PROPH/DIAG INJ SC/IM: CPT

## 2023-02-21 PROCEDURE — 72050 X-RAY EXAM NECK SPINE 4/5VWS: CPT

## 2023-02-21 PROCEDURE — 74011250636 HC RX REV CODE- 250/636: Performed by: PHYSICIAN ASSISTANT

## 2023-02-21 PROCEDURE — 99284 EMERGENCY DEPT VISIT MOD MDM: CPT

## 2023-02-21 RX ORDER — KETOROLAC TROMETHAMINE 30 MG/ML
30 INJECTION, SOLUTION INTRAMUSCULAR; INTRAVENOUS ONCE
Status: COMPLETED | OUTPATIENT
Start: 2023-02-21 | End: 2023-02-21

## 2023-02-21 RX ORDER — METHYLPREDNISOLONE 4 MG/1
4 TABLET ORAL
Qty: 1 DOSE PACK | Refills: 0 | Status: SHIPPED | OUTPATIENT
Start: 2023-02-21

## 2023-02-21 RX ORDER — IBUPROFEN 600 MG/1
600 TABLET ORAL
Qty: 20 TABLET | Refills: 0 | Status: SHIPPED | OUTPATIENT
Start: 2023-02-21

## 2023-02-21 RX ORDER — LIDOCAINE 50 MG/G
PATCH TOPICAL
Qty: 3 EACH | Refills: 0 | Status: SHIPPED | OUTPATIENT
Start: 2023-02-21

## 2023-02-21 RX ADMIN — KETOROLAC TROMETHAMINE 30 MG: 30 INJECTION, SOLUTION INTRAMUSCULAR; INTRAVENOUS at 20:41

## 2023-02-21 NOTE — Clinical Note
[address] EMERGENCY DEPARTMENT  [address] [facility] [address]  [phone]    Work/School Note    Date: 2/21/2023    To Whom It May concern:    Fletcher Stephenson was seen and treated today in the emergency room by the following provider(s):  Physician Assistant: Kristyn Amaya PA-C. Fletcher Stephenson is excused from work/school on 2/21/2023 through 2/23/2023. She is medically clear to return to work/school on 2/24/2023.          Sincerely,          Osiris Lund PA-C

## 2023-02-22 NOTE — ED PROVIDER NOTES
[facility] EMERGENCY DEPARTMENT  EMERGENCY DEPARTMENT HISTORY AND PHYSICAL EXAM      Date: 2/21/2023  Patient Name: Telly Wiggins  MRN: [id number]  YOB: 1970  Date of evaluation: 2/21/2023  Provider: Chapincito Lund PA-C   Note Started: 8:18 PM 2/21/23    HISTORY OF PRESENT ILLNESS     Chief Complaint   Patient presents with    Neck Pain     Onset two weeks ago. States pain in right side of neck down to her right hand. Reports tingling sensation. Has tried icy hot and other medications. Has an appt in April but couldn't bear the pain. Denies any injuries or trauma. History Provided By: Patient    HPI: Telly Wiggins, 47 y.o. female with a past medical history significant for HTN and GERD who presents this ED with CC of neck pain. Patient reports a 2-week history of right-sided neck pain with radiation as a \"achy and tingling\" sensation down her right arm. 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 Patient has tried treating symptoms with IcyHot with minimal improvement. Denies any additional injuries. States that she is otherwise well and has no further concerns.     PAST MEDICAL HISTORY   Past Medical History:  Past Medical History:   Diagnosis Date    Abnormal findings on diagnostic imaging of skull and head, not elsewhere classified 10/13/2020    Dysmenorrhea 10/13/2020    Essential hypertension 1/25/2021    GERD (gastroesophageal reflux disease)     Irregular intermenstrual bleeding 10/13/2020    Mild intermittent asthma without complication 53/5/9822       Past Surgical History:  Past Surgical History:   Procedure Laterality Date    COLONOSCOPY N/A 5/19/2022    COLONOSCOPY performed by Miesha Smith MD at [facility]      HX PARTIAL HYSTERECTOMY  05/12/2012       Family History:  Family History   Problem Relation Age of Onset    Hypertension Mother     Cancer Mother     Lung Cancer Mother     Hypertension Father     Cancer Father     Pancreatic Cancer Father        Social History:  Social History     Tobacco Use    Smoking status: Never    Smokeless tobacco: Never   Substance Use Topics    Alcohol use: Not Currently    Drug use: Never       Allergies: Allergies   Allergen Reactions    Penicillins Rash     ALSO ALLERGIC SEAFOOD  HIVES    Food [Shellfish Derived] Angioedema       PCP: Jose Ventura MD    Current Meds:   Previous Medications    ALBUTEROL (PROVENTIL HFA, VENTOLIN HFA, PROAIR HFA) 90 MCG/ACTUATION INHALER    Take 2 Puffs by inhalation every four to six (4-6) hours as needed for Wheezing. ATORVASTATIN (LIPITOR) 10 MG TABLET    Take 2 Tablets by mouth nightly. CETIRIZINE (ZYRTEC) 10 MG TABLET    Take 1 Tablet by mouth daily. ERGOCALCIFEROL (ERGOCALCIFEROL) 1,250 MCG (50,000 UNIT) CAPSULE    TAKE 1 CAPSULE BY MOUTH EVERY SEVEN (7) DAYS. FAMOTIDINE (PEPCID) 20 MG TABLET    Take 1 Tablet by mouth two (2) times a day. FLUTICASONE PROPIONATE (FLONASE) 50 MCG/ACTUATION NASAL SPRAY    2 Sprays by Both Nostrils route daily. HYDROCHLOROTHIAZIDE (HYDRODIURIL) 12.5 MG TABLET    Take 1 Tablet by mouth Every morning. REVIEW OF SYSTEMS   Review of Systems   Constitutional: Negative. Negative for chills, diaphoresis and fever. HENT: Negative. Negative for congestion, rhinorrhea and sore throat. Eyes: Negative. Respiratory: Negative. Negative for cough, chest tightness, shortness of breath and wheezing. Cardiovascular: Negative. Negative for chest pain and palpitations. Gastrointestinal: Negative. Negative for abdominal pain, diarrhea, nausea and vomiting. Genitourinary: Negative. Negative for difficulty urinating, dysuria, flank pain, frequency and hematuria. Musculoskeletal:  Positive for neck pain. Negative for back pain and gait problem. Skin: Negative. Negative for rash. Neurological: Negative.   Negative for dizziness, syncope, weakness, light-headedness, numbness and headaches. Psychiatric/Behavioral: Negative. All other systems reviewed and are negative. Positives and Pertinent negatives as per HPI. PHYSICAL EXAM     ED Triage Vitals [02/21/23 2002]   ED Encounter Vitals Group      /82      Pulse (Heart Rate) 71      Resp Rate 16      Temp 97.3 °F (36.3 °C)      Temp src       O2 Sat (%) 100 %      Weight       Height       Physical Exam  Vitals and nursing note reviewed. Constitutional:       General: She is not in acute distress. Appearance: Normal appearance. She is not ill-appearing or toxic-appearing. HENT:      Head: Normocephalic and atraumatic. Mouth/Throat:      Mouth: Mucous membranes are moist.      Pharynx: Oropharynx is clear. Eyes:      Extraocular Movements: Extraocular movements intact. Conjunctiva/sclera: Conjunctivae normal.      Pupils: Pupils are equal, round, and reactive to light. Cardiovascular:      Rate and Rhythm: Normal rate and regular rhythm. Pulmonary:      Effort: Pulmonary effort is normal.      Breath sounds: Normal breath sounds. No wheezing, rhonchi or rales. Musculoskeletal:      Cervical back: Neck supple. Tenderness (right paraspinal musculature with palpable trigger points) present. No deformity or bony tenderness. Thoracic back: Normal.      Lumbar back: Normal.      Comments: No step-offs or deformity   Skin:     General: Skin is warm and dry. Capillary Refill: Capillary refill takes less than 2 seconds. Findings: No rash. Neurological:      General: No focal deficit present. Mental Status: She is alert and oriented to person, place, and time. Sensory: Sensation is intact. Motor: Motor function is intact. Gait: Gait is intact.    Psychiatric:         Mood and Affect: Mood normal.         Behavior: Behavior normal.       SCREENINGS               No data recorded      LAB, EKG AND DIAGNOSTIC RESULTS Labs:  No results found for this or any previous visit (from the past 12 hour(s)). Radiologic Studies:  Non-plain film images such as CT, Ultrasound and MRI are read by the radiologist. Plain radiographic images are visualized and preliminarily interpreted by the ED Provider with the below findings:      Interpretation per the Radiologist below, if available at the time of this note:  XR SPINE CERV 4 OR 5 V    Result Date: 2/21/2023  EXAM: XR SPINE CERV 4 OR 5 V INDICATION: neck pain, with radiation down right arm COMPARISON: None. FINDINGS: AP, lateral, bilateral oblique and open mouth odontoid views  of the cervical spine were obtained. The alignment is remarkable for kyphosis. The vertebral body heights are well-preserved. There is mild disc space narrowing with spondylitic change at C5-6. There is no fracture or subluxation. The prevertebral soft tissues are normal. The odontoid process is intact and the C1-C2 relationship is normal. The neural foramina are symmetrical.     C5-6 degenerative disc disease      PROCEDURES   Unless otherwise noted below, none.   Performed by: Graham aJvier PA-C   Procedures        ED COURSE and DIFFERENTIAL DIAGNOSIS/MDM   Vitals:    Vitals:    02/21/23 2002   BP: 129/82   Pulse: 71   Resp: 16   Temp: 97.3 °F (36.3 °C)   SpO2: 100%   Weight: 66.2 kg (146 lb)   Height: 5' 2\" (1.575 m)        Patient was given the following medications:  Medications   ketorolac (TORADOL) injection 30 mg (30 mg IntraMUSCular Given 2/21/23 2041)         Records Reviewed (source and summary of external notes): Prior medical records and Nursing notes    CC/HPI Summary, DDx, ED Course, and Reassessment:   Ddx: Strain, sprain, contusion, fracture, dislocation, cervical radiculopathy, DDD, muscle spasm, rotator cuff arthropathy    Will assess with plain films, control pain, reassess, anticipate discharge with close outpatient orthopedic follow-up.      9:24 PM  Patient reassessed and updated on imaging results, which is negative for acute pathology. Patient states her pain is better controlled this time. She has been encouraged to follow-up with orthopedics within the next week should condition persist, return to ED sooner if worse. Patient has been educated on strict return precautions conveys good understanding and agreement with care plan as outlined. She has no new complaints or concerns and all of her questions have been answered. Anticipate discharge home shortly. FINAL IMPRESSION     1. Cervical radiculopathy          DISPOSITION/PLAN   Discharged    Discharge Note: The patient is stable for discharge home. The signs, symptoms, diagnosis, and discharge instructions have been discussed, understanding conveyed, and agreed upon. The patient is to follow up as recommended or return to ER should their symptoms worsen. PATIENT REFERRED TO:  Follow-up Information       Follow up With Specialties Details Why Contact Info    Shira Trujillo MD Orthopedic Surgery Schedule an appointment as soon as possible for a visit  As needed [address]      Nick Chau MD Internal Medicine Physician  As needed [address] [facility] [address]  [phone]      [address] [place] [address] Emergency Medicine  If symptoms worsen [address] [place] [id number]  [phone]              DISCHARGE MEDICATIONS:  Current Discharge Medication List        START taking these medications    Details   methylPREDNISolone (Medrol, Qasim,) 4 mg tablet Take 1 Tablet by mouth Specific Days and Specific Times. Qty: 1 Dose Pack, Refills: 0  Start date: 2/21/2023      ibuprofen (MOTRIN) 600 mg tablet Take 1 Tablet by mouth every six (6) hours as needed for Pain. Qty: 20 Tablet, Refills: 0  Start date: 2/21/2023      lidocaine (LIDODERM) 5 % Apply patch to the affected area for 12 hours a day and remove for 12 hours a day.   Qty: 3 Each, Refills: 0  Start date: 2/21/2023               DISCONTINUED MEDICATIONS:  Current Discharge Medication List          I am the Primary Clinician of Record: Deyanira Manzo PA-C (electronically signed)    (Please note that parts of this dictation were completed with voice recognition software. Quite often unanticipated grammatical, syntax, homophones, and other interpretive errors are inadvertently transcribed by the computer software. Please disregards these errors.  Please excuse any errors that have escaped final proofreading.)

## 2023-03-02 ENCOUNTER — OFFICE VISIT (OUTPATIENT)
Dept: INTERNAL MEDICINE CLINIC | Age: 53
End: 2023-03-02

## 2023-03-02 VITALS
HEART RATE: 72 BPM | RESPIRATION RATE: 18 BRPM | OXYGEN SATURATION: 100 % | TEMPERATURE: 97.9 F | BODY MASS INDEX: 28.51 KG/M2 | SYSTOLIC BLOOD PRESSURE: 120 MMHG | WEIGHT: 151 LBS | HEIGHT: 61 IN | DIASTOLIC BLOOD PRESSURE: 68 MMHG

## 2023-03-02 DIAGNOSIS — M54.12 CERVICAL RADICULOPATHY: ICD-10-CM

## 2023-03-02 DIAGNOSIS — M54.2 NECK PAIN: Primary | ICD-10-CM

## 2023-03-02 RX ORDER — METHOCARBAMOL 500 MG/1
500 TABLET, FILM COATED ORAL
Qty: 30 TABLET | Refills: 0 | Status: SHIPPED | OUTPATIENT
Start: 2023-03-02

## 2023-03-02 RX ORDER — DEXTROMETHORPHAN HYDROBROMIDE, GUAIFENESIN 5; 100 MG/5ML; MG/5ML
650 LIQUID ORAL
Qty: 60 TABLET | Refills: 1 | Status: SHIPPED | OUTPATIENT
Start: 2023-03-02

## 2023-03-02 NOTE — PROGRESS NOTES
1. \"Have you been to the ER, urgent care clinic since your last visit? Hospitalized since your last visit? \" Yes When: 02/21/2023 Where: [facility] Reason for visit: Neck Pain     2. \"Have you seen or consulted any other health care providers outside of the [address] [place] [address] since your last visit? \" No     3. For patients aged 39-70: Has the patient had a colonoscopy / FIT/ Cologuard? Yes - Care Gap present. Most recent result on file      If the patient is female:    4. For patients aged 41-77: Has the patient had a mammogram within the past 2 years? Yes - Care Gap present. Most recent result on file      5. For patients aged 21-65: Has the patient had a pap smear?  No    Chief Complaint   Patient presents with    Follow Up Chronic Condition    Hypertension    GERD    Asthma     Visit Vitals  /66 (BP 1 Location: Left upper arm, BP Patient Position: Sitting, BP Cuff Size: Adult)   Pulse 70   Temp 97.9 °F (36.6 °C) (Oral)   Resp 18   Ht 5' 1\" (1.549 m)   Wt 151 lb (68.5 kg)   SpO2 100%   BMI 28.53 kg/m²

## 2023-03-02 NOTE — LETTER
NOTIFICATION RETURN TO WORK / SCHOOL    3/2/2023 3:34 PM    Ms. [address] [place] [address]      To Whom It May Concern:    Cande Zuniga is currently under the care of [address] [place] [address]. She is recommended not to lift more than 30 pounds,avoid lifting ,pushing and pulling until seen by orthopedic on 9 th march. If there are questions or concerns please have the patient contact our office.         Sincerely,      Asa Diamond MD

## 2023-03-02 NOTE — PROGRESS NOTES
Georgie Anne (: 1970) is a 47 y.o. female, established patient, here for evaluation of the following chief complaint(s):  ED Follow-up (Neck pain and pain due to pinched nerve.)         ASSESSMENT/PLAN:  Below is the assessment and plan developed based on review of pertinent history, physical exam, labs, studies, and medications. 1. Neck pain  -     REFERRAL TO PHYSICAL THERAPY  2. Cervical radiculopathy  -     REFERRAL TO PHYSICAL THERAPY    Neck pain radiating to the right forearm. Clinically looks like cervical radiculopathy. Visited emergency room on 2023. She has been given Medrol Dosepak and ibuprofen. She could not tolerate ibuprofen. She finished Medrol dose yesterday morning. She is still having pain. She has orthopedic appointment with Dr. Herbie Garcia on . 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 Started when she was lifting patient was around 150 pounds. No neurological weakness in. She has still having pain at the neck and the right forearm and having pain while doing full range of movement. She says that lidocaine patch did not work. Started on methocarbamol but she says she has stopped short as she wants to continue work with work restrictions she does not want to stay off from the work. Work restriction notes given until she see Dr. Herbie Garcia. Physical therapy ordered twice a week for 4 to 6 weeks. She appreciated. Heating pad alternate with ice application. Methocarbamol 500 mg at bedtime because she wants to work. She should not take it daytime which can make drowsiness side effects discussed. Refills given. Blood pressure is controlled. Her last visit was in 2022 and she takes HCTZ 12.5 mg/day for control of blood pressure and she takes famotidine OTC. She takes atorvastatin 10 mg at bedtime. And she takes OTC vitamin D3.       Return in about 3 months (around 2023) for follow up for chronic condition and neck pain. .        SUBJECTIVE/OBJECTIVE:  CORONA Hatch Awkgreg with pleasant personality who works at [employer]. She started her pain when she was at work working with the person. She has to lift her son. She has to push and pull. She has a neck pain radiating to the forearm. She went to emergency room. She was diagnosed with cervical radiculopathy still having pain. Offered to stay out of work until ninth March but she says she wants work restriction because of shortage of staff. She is pleasant personality. No tingling numbness or neurological weakness. She agreed to do physical therapy and heating pad alternate with ice application and she has made appointment with Dr. Clarence Luna for ninth March. Her blood pressure is controlled. Allergies   Allergen Reactions    Penicillins Rash     ALSO ALLERGIC SEAFOOD  HIVES    Food [Shellfish Derived] Angioedema     Current Outpatient Medications   Medication Sig    methocarbamoL (ROBAXIN) 500 mg tablet Take 1 Tablet by mouth nightly as needed for Muscle Spasm(s). acetaminophen (Tylenol 8 Hour) 650 mg TbER Take 1 Tablet by mouth two (2) times daily as needed for Pain.    hydroCHLOROthiazide (HYDRODIURIL) 12.5 mg tablet Take 1 Tablet by mouth Every morning. albuterol (PROVENTIL HFA, VENTOLIN HFA, PROAIR HFA) 90 mcg/actuation inhaler Take 2 Puffs by inhalation every four to six (4-6) hours as needed for Wheezing. methylPREDNISolone (Medrol, Qasim,) 4 mg tablet Take 1 Tablet by mouth Specific Days and Specific Times. (Patient not taking: Reported on 3/2/2023)    ibuprofen (MOTRIN) 600 mg tablet Take 1 Tablet by mouth every six (6) hours as needed for Pain. (Patient not taking: Reported on 3/2/2023)    lidocaine (LIDODERM) 5 % Apply patch to the affected area for 12 hours a day and remove for 12 hours a day. (Patient not taking: Reported on 3/2/2023)    atorvastatin (LIPITOR) 10 mg tablet Take 2 Tablets by mouth nightly.  (Patient not taking: Reported on 3/2/2023)    famotidine (PEPCID) 20 mg tablet Take 1 Tablet by mouth two (2) times a day. (Patient not taking: Reported on 3/2/2023)    ergocalciferol (ERGOCALCIFEROL) 1,250 mcg (50,000 unit) capsule TAKE 1 CAPSULE BY MOUTH EVERY SEVEN (7) DAYS. (Patient not taking: Reported on 3/2/2023)    cetirizine (ZYRTEC) 10 mg tablet Take 1 Tablet by mouth daily. (Patient not taking: No sig reported)    fluticasone propionate (FLONASE) 50 mcg/actuation nasal spray 2 Sprays by Both Nostrils route daily. (Patient not taking: No sig reported)     No current facility-administered medications for this visit. Past Medical History:   Diagnosis Date    Abnormal findings on diagnostic imaging of skull and head, not elsewhere classified 10/13/2020    Dysmenorrhea 10/13/2020    Essential hypertension 1/25/2021    GERD (gastroesophageal reflux disease)     Irregular intermenstrual bleeding 10/13/2020    Mild intermittent asthma without complication 43/3/5253     Past Surgical History:   Procedure Laterality Date    COLONOSCOPY N/A 5/19/2022    COLONOSCOPY performed by Lashawn Mart MD at [facility] GYN      HX PARTIAL HYSTERECTOMY  05/12/2012     Family History   Problem Relation Age of Onset    Hypertension Mother     Cancer Mother     Lung Cancer Mother     Hypertension Father     Cancer Father     Pancreatic Cancer Father      Social History     Tobacco Use   Smoking Status Never   Smokeless Tobacco Never             Review of Systems   Constitutional: Negative. Eyes:  Negative for blurred vision. Respiratory:  Negative for cough, shortness of breath and wheezing. Cardiovascular: Negative. Gastrointestinal: Negative. Genitourinary: Negative. Musculoskeletal:  Positive for neck pain. Negative for back pain, falls, joint pain and myalgias. Neurological:  Negative for dizziness, tingling, sensory change and headaches. Endo/Heme/Allergies:  Negative for polydipsia.  Does not bruise/bleed easily. Psychiatric/Behavioral: Negative. Vitals:    03/02/23 1504 03/02/23 1536   BP: 111/66 120/68   Pulse: 70 72   Resp: 18    Temp: 97.9 °F (36.6 °C)    TempSrc: Oral    SpO2: 100%    Weight: 151 lb (68.5 kg)    Height: 5' 1\" (1.549 m)           Physical Exam  Constitutional:       General: She is not in acute distress. Appearance: Normal appearance. She is not ill-appearing or toxic-appearing. Cardiovascular:      Rate and Rhythm: Normal rate and regular rhythm. Pulses: Normal pulses. Heart sounds: No murmur heard. Pulmonary:      Effort: Pulmonary effort is normal. No respiratory distress. Breath sounds: Normal breath sounds. No wheezing or rhonchi. Abdominal:      General: Bowel sounds are normal. There is no distension. Palpations: Abdomen is soft. Tenderness: There is no abdominal tenderness. There is no guarding. Musculoskeletal:         General: Tenderness present. No swelling or deformity. Cervical back: Neck supple. Right lower leg: No edema. Left lower leg: No edema. Comments: Range of movement restricted at the neck especially turning right side and also range of movement restricted and having pain while turning on right side and moving her right forearm and also having localized tenderness at the right scapula. Skin:     Findings: No bruising or erythema. Neurological:      General: No focal deficit present. Mental Status: She is alert and oriented to person, place, and time. Mental status is at baseline. Cranial Nerves: No cranial nerve deficit. Motor: No weakness.       Gait: Gait normal.   Psychiatric:         Mood and Affect: Mood normal.         Behavior: Behavior normal.       On this date 03/02/2023 I have spent 30 minutes reviewing previous notes, test results and face to face with the patient discussing the diagnosis and importance of compliance with the treatment plan as well as documenting on the day of the visit. An electronic signature was used to authenticate this note.   -- Kavon Peñaloza MD

## 2023-03-31 PROBLEM — Z12.4 SCREENING FOR CERVICAL CANCER: Status: ACTIVE | Noted: 2023-03-31

## 2023-04-30 PROBLEM — Z12.4 SCREENING FOR CERVICAL CANCER: Status: RESOLVED | Noted: 2023-03-31 | Resolved: 2023-04-30

## 2023-05-24 RX ORDER — ALBUTEROL SULFATE 90 UG/1
2 AEROSOL, METERED RESPIRATORY (INHALATION)
COMMUNITY
Start: 2022-06-07

## 2023-05-24 RX ORDER — LIDOCAINE 50 MG/G
PATCH TOPICAL
COMMUNITY
Start: 2023-02-21

## 2023-05-24 RX ORDER — IBUPROFEN 600 MG/1
600 TABLET ORAL EVERY 6 HOURS PRN
COMMUNITY
Start: 2023-02-21

## 2023-05-24 RX ORDER — METHYLPREDNISOLONE 4 MG/1
4 TABLET ORAL
COMMUNITY
Start: 2023-02-21

## 2023-05-24 RX ORDER — ERGOCALCIFEROL 1.25 MG/1
50000 CAPSULE ORAL
COMMUNITY
Start: 2022-06-24

## 2023-05-24 RX ORDER — FLUTICASONE PROPIONATE 50 MCG
2 SPRAY, SUSPENSION (ML) NASAL DAILY
COMMUNITY
Start: 2022-04-04

## 2023-05-24 RX ORDER — FAMOTIDINE 20 MG/1
20 TABLET, FILM COATED ORAL 2 TIMES DAILY
COMMUNITY
Start: 2022-10-05

## 2023-05-24 RX ORDER — METHOCARBAMOL 500 MG/1
500 TABLET, FILM COATED ORAL
COMMUNITY
Start: 2023-04-06

## 2023-05-24 RX ORDER — HYDROCHLOROTHIAZIDE 12.5 MG/1
12.5 TABLET ORAL EVERY MORNING
COMMUNITY
Start: 2022-10-05

## 2023-05-24 RX ORDER — SENNOSIDES 8.6 MG
650 CAPSULE ORAL 2 TIMES DAILY PRN
COMMUNITY
Start: 2023-03-02

## 2023-05-24 RX ORDER — ATORVASTATIN CALCIUM 10 MG/1
2 TABLET, FILM COATED ORAL NIGHTLY
COMMUNITY
Start: 2022-10-06

## 2023-05-24 RX ORDER — CETIRIZINE HYDROCHLORIDE 10 MG/1
10 TABLET ORAL DAILY
COMMUNITY
Start: 2022-04-04

## 2023-08-19 PROBLEM — Z12.4 SCREENING FOR CERVICAL CANCER: Status: ACTIVE | Noted: 2023-03-31

## 2023-08-19 PROBLEM — E78.2 MIXED HYPERLIPIDEMIA: Status: ACTIVE | Noted: 2023-08-19

## 2023-08-19 PROBLEM — R74.01 ELEVATED ALT MEASUREMENT: Status: RESOLVED | Noted: 2022-03-20 | Resolved: 2023-08-19

## 2023-08-23 NOTE — PROGRESS NOTES
1. \"Have you been to the ER, urgent care clinic since your last visit? Hospitalized since your last visit? \" No    2. \"Have you seen or consulted any other health care providers outside of the [address] [place] [address] since your last visit? \" No     3. For patients aged 39-70: Has the patient had a colonoscopy / FIT/ Cologuard? Yes - no Care Gap present      If the patient is female:    4. For patients aged 41-77: Has the patient had a mammogram within the past 2 years? Yes - no Care Gap present      5. For patients aged 21-65: Has the patient had a pap smear?  No      Chief Complaint   Patient presents with    Follow Up Chronic Condition    Knee Swelling        Visit Vitals  /87 (BP 1 Location: Right upper arm, BP Patient Position: Sitting, BP Cuff Size: Adult)   Pulse 72   Resp 18   Ht 5' 1\" (1.549 m)   Wt 149 lb (67.6 kg)   SpO2 99%   BMI 28.15 kg/m² [Home] : at home, [unfilled] , at the time of the visit. [Medical Office: ([facility])___] : at the medical office located in  [Verbal consent obtained from patient] : the patient, [unfilled]

## 2023-08-24 ENCOUNTER — OFFICE VISIT (OUTPATIENT)
Facility: CLINIC | Age: 53
End: 2023-08-24
Payer: COMMERCIAL

## 2023-08-24 VITALS
DIASTOLIC BLOOD PRESSURE: 72 MMHG | BODY MASS INDEX: 28.13 KG/M2 | SYSTOLIC BLOOD PRESSURE: 110 MMHG | OXYGEN SATURATION: 98 % | HEART RATE: 72 BPM | HEIGHT: 61 IN | TEMPERATURE: 98.7 F | WEIGHT: 149 LBS

## 2023-08-24 DIAGNOSIS — E78.2 MIXED HYPERLIPIDEMIA: ICD-10-CM

## 2023-08-24 DIAGNOSIS — K21.9 GASTROESOPHAGEAL REFLUX DISEASE WITHOUT ESOPHAGITIS: ICD-10-CM

## 2023-08-24 DIAGNOSIS — M54.12 CERVICAL RADICULOPATHY: ICD-10-CM

## 2023-08-24 DIAGNOSIS — Z12.4 SCREENING FOR CERVICAL CANCER: ICD-10-CM

## 2023-08-24 DIAGNOSIS — R05.1 ACUTE COUGH: ICD-10-CM

## 2023-08-24 DIAGNOSIS — I10 ESSENTIAL HYPERTENSION: ICD-10-CM

## 2023-08-24 DIAGNOSIS — J30.9 ALLERGIC RHINITIS, UNSPECIFIED SEASONALITY, UNSPECIFIED TRIGGER: ICD-10-CM

## 2023-08-24 DIAGNOSIS — I10 ESSENTIAL HYPERTENSION: Primary | ICD-10-CM

## 2023-08-24 DIAGNOSIS — J45.20 MILD INTERMITTENT ASTHMA WITHOUT COMPLICATION: ICD-10-CM

## 2023-08-24 PROCEDURE — 3074F SYST BP LT 130 MM HG: CPT | Performed by: INTERNAL MEDICINE

## 2023-08-24 PROCEDURE — 99214 OFFICE O/P EST MOD 30 MIN: CPT | Performed by: INTERNAL MEDICINE

## 2023-08-24 PROCEDURE — 3078F DIAST BP <80 MM HG: CPT | Performed by: INTERNAL MEDICINE

## 2023-08-24 RX ORDER — CHOLECALCIFEROL (VITAMIN D3) 125 MCG
CAPSULE ORAL
Qty: 30 TABLET | Refills: 5 | Status: SHIPPED | OUTPATIENT
Start: 2023-08-24

## 2023-08-24 RX ORDER — HYDROCHLOROTHIAZIDE 12.5 MG/1
12.5 TABLET ORAL EVERY MORNING
Qty: 90 TABLET | Refills: 1 | Status: SHIPPED | OUTPATIENT
Start: 2023-08-24

## 2023-08-24 RX ORDER — FLUTICASONE PROPIONATE 50 MCG
2 SPRAY, SUSPENSION (ML) NASAL DAILY
Qty: 16 G | Refills: 2 | Status: SHIPPED | OUTPATIENT
Start: 2023-08-24

## 2023-08-24 RX ORDER — NABUMETONE 500 MG/1
500 TABLET, FILM COATED ORAL 2 TIMES DAILY
COMMUNITY
Start: 2023-07-14

## 2023-08-24 RX ORDER — CETIRIZINE HYDROCHLORIDE 10 MG/1
10 TABLET ORAL DAILY
Qty: 30 TABLET | Refills: 2 | Status: SHIPPED | OUTPATIENT
Start: 2023-08-24

## 2023-08-24 ASSESSMENT — ENCOUNTER SYMPTOMS
EYES NEGATIVE: 1
COUGH: 1
GASTROINTESTINAL NEGATIVE: 1
ALLERGIC/IMMUNOLOGIC NEGATIVE: 1

## 2023-08-31 LAB
ALBUMIN SERPL-MCNC: 4.4 G/DL (ref 3.8–4.9)
ALBUMIN/GLOB SERPL: 1.4 {RATIO} (ref 1.2–2.2)
ALP SERPL-CCNC: 56 IU/L (ref 44–121)
ALT SERPL-CCNC: 25 IU/L (ref 0–32)
AST SERPL-CCNC: 24 IU/L (ref 0–40)
BASOPHILS # BLD AUTO: 0 X10E3/UL (ref 0–0.2)
BASOPHILS NFR BLD AUTO: 1 %
BILIRUB SERPL-MCNC: <0.2 MG/DL (ref 0–1.2)
BUN SERPL-MCNC: 11 MG/DL (ref 6–24)
BUN/CREAT SERPL: 13 (ref 9–23)
CALCIUM SERPL-MCNC: 9.7 MG/DL (ref 8.7–10.2)
CHLORIDE SERPL-SCNC: 102 MMOL/L (ref 96–106)
CHOLEST SERPL-MCNC: 273 MG/DL (ref 100–199)
CO2 SERPL-SCNC: 21 MMOL/L (ref 20–29)
CREAT SERPL-MCNC: 0.85 MG/DL (ref 0.57–1)
EGFRCR SERPLBLD CKD-EPI 2021: 83 ML/MIN/1.73
EOSINOPHIL # BLD AUTO: 0.1 X10E3/UL (ref 0–0.4)
EOSINOPHIL NFR BLD AUTO: 2 %
ERYTHROCYTE [DISTWIDTH] IN BLOOD BY AUTOMATED COUNT: 12.2 % (ref 11.7–15.4)
GLOBULIN SER CALC-MCNC: 3.1 G/DL (ref 1.5–4.5)
GLUCOSE SERPL-MCNC: 61 MG/DL (ref 70–99)
HCT VFR BLD AUTO: 38.6 % (ref 34–46.6)
HDLC SERPL-MCNC: 61 MG/DL
HGB BLD-MCNC: 12.9 G/DL (ref 11.1–15.9)
IMM GRANULOCYTES # BLD AUTO: 0 X10E3/UL (ref 0–0.1)
IMM GRANULOCYTES NFR BLD AUTO: 0 %
LDLC SERPL CALC-MCNC: 177 MG/DL (ref 0–99)
LYMPHOCYTES # BLD AUTO: 2.3 X10E3/UL (ref 0.7–3.1)
LYMPHOCYTES NFR BLD AUTO: 45 %
MCH RBC QN AUTO: 29.3 PG (ref 26.6–33)
MCHC RBC AUTO-ENTMCNC: 33.4 G/DL (ref 31.5–35.7)
MCV RBC AUTO: 88 FL (ref 79–97)
MONOCYTES # BLD AUTO: 0.5 X10E3/UL (ref 0.1–0.9)
MONOCYTES NFR BLD AUTO: 9 %
NEUTROPHILS # BLD AUTO: 2.2 X10E3/UL (ref 1.4–7)
NEUTROPHILS NFR BLD AUTO: 43 %
PLATELET # BLD AUTO: 284 X10E3/UL (ref 150–450)
POTASSIUM SERPL-SCNC: 3.8 MMOL/L (ref 3.5–5.2)
PROT SERPL-MCNC: 7.5 G/DL (ref 6–8.5)
RBC # BLD AUTO: 4.41 X10E6/UL (ref 3.77–5.28)
SODIUM SERPL-SCNC: 138 MMOL/L (ref 134–144)
TRIGL SERPL-MCNC: 191 MG/DL (ref 0–149)
TSH SERPL DL<=0.005 MIU/L-ACNC: 1.37 UIU/ML (ref 0.45–4.5)
VLDLC SERPL CALC-MCNC: 35 MG/DL (ref 5–40)
WBC # BLD AUTO: 5.2 X10E3/UL (ref 3.4–10.8)

## 2023-09-18 PROBLEM — Z12.4 SCREENING FOR CERVICAL CANCER: Status: RESOLVED | Noted: 2023-03-31 | Resolved: 2023-09-18

## 2023-11-09 RX ORDER — SENNOSIDES 8.6 MG
650 CAPSULE ORAL 2 TIMES DAILY
Qty: 60 TABLET | Refills: 3 | Status: SHIPPED | OUTPATIENT
Start: 2023-11-09

## 2023-11-09 RX ORDER — FLUTICASONE PROPIONATE 50 MCG
SPRAY, SUSPENSION (ML) NASAL
Qty: 1 EACH | Refills: 3 | Status: SHIPPED | OUTPATIENT
Start: 2023-11-09

## 2024-05-08 RX ORDER — HYDROCHLOROTHIAZIDE 12.5 MG/1
12.5 TABLET ORAL EVERY MORNING
Qty: 90 TABLET | Refills: 1 | Status: SHIPPED | OUTPATIENT
Start: 2024-05-08

## 2024-06-13 ENCOUNTER — OFFICE VISIT (OUTPATIENT)
Facility: CLINIC | Age: 54
End: 2024-06-13
Payer: COMMERCIAL

## 2024-06-13 VITALS
DIASTOLIC BLOOD PRESSURE: 72 MMHG | RESPIRATION RATE: 18 BRPM | SYSTOLIC BLOOD PRESSURE: 110 MMHG | BODY MASS INDEX: 28.81 KG/M2 | HEART RATE: 80 BPM | HEIGHT: 61 IN | TEMPERATURE: 98 F | WEIGHT: 152.6 LBS | OXYGEN SATURATION: 97 %

## 2024-06-13 DIAGNOSIS — I10 ESSENTIAL HYPERTENSION: ICD-10-CM

## 2024-06-13 DIAGNOSIS — J45.20 MILD INTERMITTENT ASTHMA WITHOUT COMPLICATION: ICD-10-CM

## 2024-06-13 DIAGNOSIS — M25.561 CHRONIC PAIN OF RIGHT KNEE: ICD-10-CM

## 2024-06-13 DIAGNOSIS — G89.29 CHRONIC PAIN OF RIGHT KNEE: ICD-10-CM

## 2024-06-13 DIAGNOSIS — E78.2 MIXED HYPERLIPIDEMIA: ICD-10-CM

## 2024-06-13 DIAGNOSIS — Z23 NEED FOR DIPHTHERIA-TETANUS-PERTUSSIS (TDAP) VACCINE: ICD-10-CM

## 2024-06-13 DIAGNOSIS — E55.9 VITAMIN D DEFICIENCY: ICD-10-CM

## 2024-06-13 DIAGNOSIS — K21.9 GASTROESOPHAGEAL REFLUX DISEASE WITHOUT ESOPHAGITIS: ICD-10-CM

## 2024-06-13 DIAGNOSIS — Z12.31 ENCOUNTER FOR SCREENING MAMMOGRAM FOR MALIGNANT NEOPLASM OF BREAST: ICD-10-CM

## 2024-06-13 DIAGNOSIS — M25.461 SWELLING OF KNEE JOINT, RIGHT: ICD-10-CM

## 2024-06-13 PROCEDURE — 3074F SYST BP LT 130 MM HG: CPT | Performed by: INTERNAL MEDICINE

## 2024-06-13 PROCEDURE — 99396 PREV VISIT EST AGE 40-64: CPT | Performed by: INTERNAL MEDICINE

## 2024-06-13 PROCEDURE — 90715 TDAP VACCINE 7 YRS/> IM: CPT | Performed by: INTERNAL MEDICINE

## 2024-06-13 PROCEDURE — 3078F DIAST BP <80 MM HG: CPT | Performed by: INTERNAL MEDICINE

## 2024-06-13 PROCEDURE — 90471 IMMUNIZATION ADMIN: CPT | Performed by: INTERNAL MEDICINE

## 2024-06-13 RX ORDER — HYDROCHLOROTHIAZIDE 12.5 MG/1
12.5 TABLET ORAL EVERY MORNING
Qty: 90 TABLET | Refills: 2 | Status: SHIPPED | OUTPATIENT
Start: 2024-06-13

## 2024-06-13 RX ORDER — ERGOCALCIFEROL 1.25 MG/1
50000 CAPSULE ORAL WEEKLY
Qty: 12 CAPSULE | Refills: 1 | Status: SHIPPED | OUTPATIENT
Start: 2024-06-13

## 2024-06-13 RX ORDER — ALBUTEROL SULFATE 90 UG/1
2 AEROSOL, METERED RESPIRATORY (INHALATION) EVERY 6 HOURS PRN
Qty: 18 G | Refills: 4 | Status: SHIPPED | OUTPATIENT
Start: 2024-06-13

## 2024-06-13 RX ORDER — OMEPRAZOLE 40 MG/1
40 CAPSULE, DELAYED RELEASE ORAL
Qty: 30 CAPSULE | Refills: 4 | Status: SHIPPED | OUTPATIENT
Start: 2024-06-13

## 2024-06-13 RX ORDER — ERGOCALCIFEROL 1.25 MG/1
50000 CAPSULE ORAL WEEKLY
COMMUNITY
End: 2024-06-13

## 2024-06-13 RX ORDER — OMEPRAZOLE 40 MG/1
40 CAPSULE, DELAYED RELEASE ORAL DAILY
COMMUNITY
End: 2024-06-13 | Stop reason: SDUPTHER

## 2024-06-13 SDOH — ECONOMIC STABILITY: FOOD INSECURITY: WITHIN THE PAST 12 MONTHS, THE FOOD YOU BOUGHT JUST DIDN'T LAST AND YOU DIDN'T HAVE MONEY TO GET MORE.: SOMETIMES TRUE

## 2024-06-13 SDOH — ECONOMIC STABILITY: FOOD INSECURITY: WITHIN THE PAST 12 MONTHS, YOU WORRIED THAT YOUR FOOD WOULD RUN OUT BEFORE YOU GOT MONEY TO BUY MORE.: SOMETIMES TRUE

## 2024-06-13 SDOH — ECONOMIC STABILITY: INCOME INSECURITY: HOW HARD IS IT FOR YOU TO PAY FOR THE VERY BASICS LIKE FOOD, HOUSING, MEDICAL CARE, AND HEATING?: HARD

## 2024-06-13 SDOH — ECONOMIC STABILITY: HOUSING INSECURITY
IN THE LAST 12 MONTHS, WAS THERE A TIME WHEN YOU DID NOT HAVE A STEADY PLACE TO SLEEP OR SLEPT IN A SHELTER (INCLUDING NOW)?: NO

## 2024-06-13 ASSESSMENT — PATIENT HEALTH QUESTIONNAIRE - PHQ9
1. LITTLE INTEREST OR PLEASURE IN DOING THINGS: NOT AT ALL
SUM OF ALL RESPONSES TO PHQ QUESTIONS 1-9: 0
SUM OF ALL RESPONSES TO PHQ QUESTIONS 1-9: 0
2. FEELING DOWN, DEPRESSED OR HOPELESS: NOT AT ALL
SUM OF ALL RESPONSES TO PHQ QUESTIONS 1-9: 0
SUM OF ALL RESPONSES TO PHQ QUESTIONS 1-9: 0
SUM OF ALL RESPONSES TO PHQ9 QUESTIONS 1 & 2: 0

## 2024-06-13 ASSESSMENT — ENCOUNTER SYMPTOMS
ALLERGIC/IMMUNOLOGIC NEGATIVE: 1
EYES NEGATIVE: 1
RESPIRATORY NEGATIVE: 1
GASTROINTESTINAL NEGATIVE: 1

## 2024-06-13 NOTE — PATIENT INSTRUCTIONS
[place], [facility], [place], [place], [place], [place], [place] and [place]: You may also apply by calling [facility], [facility] Agency on [phone]  For Cities of [place], [place], and [place] as well as Counties of [place], [facility], [place], [place], and [place]:    Contact [facility] Agency on [phone]    [facility] Service Area:   [facility], [place], [facility], [place], [place], [place], [place], [place], [place] and [place].  Website: [email or web address]  To Apply by phone: [phone]    [place] [facility] (PSR) area:   [facility], [place], [place], [place], [place], [facility], and [facility].   Website: [email or web address]  For More Information: Call [phone] or email [email or web address]    Meals on [facility]:   Website: [email or web address]  To Apply Online: [email or web address]  To Apply by phone: [phone]    Meals on [facility]:  [phone]            Other Resources:     [employer] Fresh [employer] via Mobile [employer]   What they offer: “Need[employer] is a nonprofit working together to build healthy communities by growing and sharing healthy food.  The food we grow is distributed through our network of programs and partnerships in communities where access to healthy food is limited.”   Website: https://eMarketer/find-fresh-food/  Phone: 531.855.2296   Cash, cards, and SNAP/EBT accepted      [facility] [employer]  What they offer: A nutrition midday meal and interaction with fellow community members.  There are 20 [employer] through the region including [facility], and the [facility] of [place], [place], [place], [place], [place], [place], and [place]  Website:

## 2024-06-13 NOTE — PROGRESS NOTES
Azalea Mercedes (: 1970) is a 53 y.o. female, Established patient patient, here for evaluation of the following chief complaint(s):  Annual Exam         ASSESSMENT/PLAN:  Below is the assessment and plan developed based on review of pertinent history, physical exam, labs, studies, and medications.      1. Essential hypertension  -     CBC with Auto Differential; Future  -     Comprehensive Metabolic Panel; Future  2. Mixed hyperlipidemia  -     Comprehensive Metabolic Panel; Future  -     Lipid Panel; Future  3. Swelling of knee joint, right  -     [facility]- Jose Osman MD Orthopedic Surgery (knee)[facility]  4. Chronic pain of right knee  -     [facility]  5. Vitamin D deficiency  -     Vitamin D 25 Hydroxy; Future  6. Mild intermittent asthma without complication  7. Gastroesophageal reflux disease without esophagitis  8. Encounter for screening mammogram for malignant neoplasm of breast  -     [facility] JOANA DIGITAL SCREEN BILATERAL; Future  9. Need for diphtheria-tetanus-pertussis (Tdap) vaccine  -     Tdap, BOOSTRIX, (age 10 yrs+), IM    Annual preventive physical exam.  Age-appropriate preventive health education screening and vaccinations advised.  She agreed to take Shingrix vaccine.  She will take pneumonia 20 vaccine from the pharmacy.  She agreed to take Tdap vaccine in our office.  Her blood pressure is controlled.  She needs refills.  She had her labs done in 2023.    She has mixed hyperlipidemia not taking any statin repeat fasting labs ordered diet low in greasy oily fried food and saturated fat and starch and sugar    She needs to walk.  She says she has right knee swelling near patella and some pain but not much and she is not taking any NSAIDs and she agreed to see orthopedic surgeon she might need x-ray and further workup depending on the fluid and intra-articular steroid injection.  She has no neck pain.  She wants to do mammogram.  She

## 2024-06-13 NOTE — PROGRESS NOTES
Chief Complaint   Patient presents with    Annual Exam     /72 (Site: Right Upper Arm, Position: Sitting)   Pulse 80   Temp 98 °F (36.7 °C) (Oral)   Resp 18   Ht 1.549 m (5' 0.98\")   Wt 69.2 kg (152 lb 9.6 oz)   SpO2 97%   BMI 28.85 kg/m²       \"Have you been to the ER, urgent care clinic since your last visit?  Hospitalized since your last visit?\"    NO    “Have you seen or consulted any other health care providers outside of [facility] since your last visit?”    NO    Have you had a mammogram?”   NO    Date of last Mammogram: 4/15/2022             Click Here for Release of Records Request

## 2024-06-22 LAB
25(OH)D3+25(OH)D2 SERPL-MCNC: 27.1 NG/ML (ref 30–100)
ALBUMIN SERPL-MCNC: 4.4 G/DL (ref 3.8–4.9)
ALP SERPL-CCNC: 68 IU/L (ref 44–121)
ALT SERPL-CCNC: 25 IU/L (ref 0–32)
AST SERPL-CCNC: 23 IU/L (ref 0–40)
BASOPHILS # BLD AUTO: 0.1 X10E3/UL (ref 0–0.2)
BASOPHILS NFR BLD AUTO: 1 %
BILIRUB SERPL-MCNC: 0.3 MG/DL (ref 0–1.2)
BUN SERPL-MCNC: 11 MG/DL (ref 6–24)
BUN/CREAT SERPL: 13 (ref 9–23)
CALCIUM SERPL-MCNC: 10.1 MG/DL (ref 8.7–10.2)
CHLORIDE SERPL-SCNC: 98 MMOL/L (ref 96–106)
CHOLEST SERPL-MCNC: 328 MG/DL (ref 100–199)
CO2 SERPL-SCNC: 24 MMOL/L (ref 20–29)
CREAT SERPL-MCNC: 0.87 MG/DL (ref 0.57–1)
EGFRCR SERPLBLD CKD-EPI 2021: 80 ML/MIN/1.73
EOSINOPHIL # BLD AUTO: 0.1 X10E3/UL (ref 0–0.4)
EOSINOPHIL NFR BLD AUTO: 2 %
ERYTHROCYTE [DISTWIDTH] IN BLOOD BY AUTOMATED COUNT: 12.1 % (ref 11.7–15.4)
GLOBULIN SER CALC-MCNC: 3.6 G/DL (ref 1.5–4.5)
GLUCOSE SERPL-MCNC: 91 MG/DL (ref 70–99)
HCT VFR BLD AUTO: 41.4 % (ref 34–46.6)
HDLC SERPL-MCNC: 65 MG/DL
HGB BLD-MCNC: 13.9 G/DL (ref 11.1–15.9)
IMM GRANULOCYTES # BLD AUTO: 0 X10E3/UL (ref 0–0.1)
IMM GRANULOCYTES NFR BLD AUTO: 0 %
LDLC SERPL CALC-MCNC: 250 MG/DL (ref 0–99)
LYMPHOCYTES # BLD AUTO: 2 X10E3/UL (ref 0.7–3.1)
LYMPHOCYTES NFR BLD AUTO: 38 %
Lab: ABNORMAL
MCH RBC QN AUTO: 29.9 PG (ref 26.6–33)
MCHC RBC AUTO-ENTMCNC: 33.6 G/DL (ref 31.5–35.7)
MCV RBC AUTO: 89 FL (ref 79–97)
MONOCYTES # BLD AUTO: 0.6 X10E3/UL (ref 0.1–0.9)
MONOCYTES NFR BLD AUTO: 12 %
NEUTROPHILS # BLD AUTO: 2.4 X10E3/UL (ref 1.4–7)
NEUTROPHILS NFR BLD AUTO: 47 %
PLATELET # BLD AUTO: 287 X10E3/UL (ref 150–450)
POTASSIUM SERPL-SCNC: 3.9 MMOL/L (ref 3.5–5.2)
PROT SERPL-MCNC: 8 G/DL (ref 6–8.5)
RBC # BLD AUTO: 4.65 X10E6/UL (ref 3.77–5.28)
SODIUM SERPL-SCNC: 136 MMOL/L (ref 134–144)
TRIGL SERPL-MCNC: 82 MG/DL (ref 0–149)
VLDLC SERPL CALC-MCNC: 13 MG/DL (ref 5–40)
WBC # BLD AUTO: 5.2 X10E3/UL (ref 3.4–10.8)

## 2024-07-03 ENCOUNTER — HOSPITAL ENCOUNTER (OUTPATIENT)
Facility: HOSPITAL | Age: 54
Discharge: HOME | End: 2024-07-06
Attending: INTERNAL MEDICINE
Payer: COMMERCIAL

## 2024-07-03 DIAGNOSIS — Z12.31 ENCOUNTER FOR SCREENING MAMMOGRAM FOR MALIGNANT NEOPLASM OF BREAST: ICD-10-CM

## 2024-07-03 PROCEDURE — 77063 BREAST TOMOSYNTHESIS BI: CPT

## 2024-07-08 ENCOUNTER — HOSPITAL ENCOUNTER (EMERGENCY)
Facility: HOSPITAL | Age: 54
Discharge: HOME | End: 2024-07-08
Attending: STUDENT IN AN ORGANIZED HEALTH CARE EDUCATION/TRAINING PROGRAM
Payer: COMMERCIAL

## 2024-07-08 VITALS
HEART RATE: 90 BPM | RESPIRATION RATE: 16 BRPM | TEMPERATURE: 99.8 F | SYSTOLIC BLOOD PRESSURE: 120 MMHG | WEIGHT: 151 LBS | HEIGHT: 61 IN | OXYGEN SATURATION: 100 % | BODY MASS INDEX: 28.51 KG/M2 | DIASTOLIC BLOOD PRESSURE: 60 MMHG

## 2024-07-08 DIAGNOSIS — J02.9 ACUTE PHARYNGITIS, UNSPECIFIED ETIOLOGY: Primary | ICD-10-CM

## 2024-07-08 LAB — DEPRECATED S PYO AG THROAT QL EIA: NEGATIVE

## 2024-07-08 PROCEDURE — 87880 STREP A ASSAY W/OPTIC: CPT

## 2024-07-08 PROCEDURE — 87070 CULTURE OTHR SPECIMN AEROBIC: CPT

## 2024-07-08 PROCEDURE — 99284 EMERGENCY DEPT VISIT MOD MDM: CPT

## 2024-07-08 PROCEDURE — 87147 CULTURE TYPE IMMUNOLOGIC: CPT

## 2024-07-08 PROCEDURE — 6360000002 HC RX W HCPCS: Performed by: STUDENT IN AN ORGANIZED HEALTH CARE EDUCATION/TRAINING PROGRAM

## 2024-07-08 PROCEDURE — 96374 THER/PROPH/DIAG INJ IV PUSH: CPT

## 2024-07-08 RX ORDER — AZITHROMYCIN 250 MG/1
TABLET, FILM COATED ORAL
Qty: 6 TABLET | Refills: 0 | Status: SHIPPED | OUTPATIENT
Start: 2024-07-08 | End: 2024-07-18

## 2024-07-08 RX ORDER — DEXAMETHASONE SODIUM PHOSPHATE 10 MG/ML
8 INJECTION, SOLUTION INTRAMUSCULAR; INTRAVENOUS
Status: COMPLETED | OUTPATIENT
Start: 2024-07-08 | End: 2024-07-08

## 2024-07-08 RX ORDER — KETOROLAC TROMETHAMINE 30 MG/ML
30 INJECTION, SOLUTION INTRAMUSCULAR; INTRAVENOUS
Status: COMPLETED | OUTPATIENT
Start: 2024-07-08 | End: 2024-07-08

## 2024-07-08 RX ADMIN — KETOROLAC TROMETHAMINE 30 MG: 30 INJECTION, SOLUTION INTRAMUSCULAR at 22:49

## 2024-07-08 RX ADMIN — DEXAMETHASONE SODIUM PHOSPHATE 8 MG: 10 INJECTION, SOLUTION INTRAMUSCULAR; INTRAVENOUS at 22:49

## 2024-07-08 ASSESSMENT — PAIN DESCRIPTION - LOCATION
LOCATION: THROAT
LOCATION: THROAT

## 2024-07-08 ASSESSMENT — LIFESTYLE VARIABLES
HOW MANY STANDARD DRINKS CONTAINING ALCOHOL DO YOU HAVE ON A TYPICAL DAY: 1 OR 2
HOW OFTEN DO YOU HAVE A DRINK CONTAINING ALCOHOL: 2-4 TIMES A MONTH

## 2024-07-08 ASSESSMENT — PAIN DESCRIPTION - DESCRIPTORS: DESCRIPTORS: ACHING

## 2024-07-08 ASSESSMENT — PAIN SCALES - GENERAL
PAINLEVEL_OUTOF10: 10
PAINLEVEL_OUTOF10: 10

## 2024-07-08 ASSESSMENT — PAIN - FUNCTIONAL ASSESSMENT: PAIN_FUNCTIONAL_ASSESSMENT: 0-10

## 2024-07-09 NOTE — ED PROVIDER NOTES
[facility] EMERGENCY DEPT  EMERGENCY DEPARTMENT ENCOUNTER       Pt Name: Azalea Mercedes  MRN: [id number]  Birthdate 1970  Date of evaluation: 7/8/2024  Provider: Gonzalo Palmer MD   PCP: Cherie Sahni MD  Note Started: 10:48 PM EDT 7/8/24    CHIEF COMPLAINT       Chief Complaint   Patient presents with    Pharyngitis    Chills        HISTORY OF PRESENT ILLNESS: 1 or more elements      History From: Patient     Azalea Mercedes is a 53 y.o. female presents to emergency department for throat pain, chills, started yesterday.  Patient states she has been taking Tylenol with minimal relief.  Pain on swallowing, mild cough, no shortness of breath, no muffled voice     Nursing Notes were all reviewed and agreed with or any disagreements were addressed in the HPI.  Patient denies any SOB, Chest pain or neurological deficits.      PAST HISTORY     Past Medical History:  Past Medical History:   Diagnosis Date    Abnormal findings on diagnostic imaging of skull and head, not elsewhere classified 10/13/2020    Dysmenorrhea 10/13/2020    Essential hypertension 1/25/2021    GERD (gastroesophageal reflux disease)     Irregular intermenstrual bleeding 10/13/2020    Mild intermittent asthma without complication 10/5/2022       Past Surgical History:  Past Surgical History:   Procedure Laterality Date    GYN      PARTIAL HYSTERECTOMY (CERVIX NOT REMOVED)  05/12/2012       Family History:  Family History   Problem Relation Age of Onset    Pancreatic Cancer Father     Cancer Father     Cancer Mother     Lung Cancer Mother     Hypertension Father     Hypertension Mother        Social History:  Social History     Tobacco Use    Smoking status: Never    Smokeless tobacco: Never   Substance Use Topics    Alcohol use: Not Currently    Drug use: Never       Allergies:  Allergies   Allergen Reactions    Shellfish Allergy Angioedema    Penicillins Rash     ALSO ALLERGIC SEAFOOD  HIVES       CURRENT MEDICATIONS      Previous Medications

## 2024-07-11 ENCOUNTER — TELEPHONE (OUTPATIENT)
Facility: CLINIC | Age: 54
End: 2024-07-11

## 2024-07-11 LAB
BACTERIA SPEC CULT: ABNORMAL
BACTERIA SPEC CULT: ABNORMAL
Lab: ABNORMAL

## 2024-07-11 NOTE — TELEPHONE ENCOUNTER
Per patient was seen in ED 07/08/2024 and still isn't feeling well. Requesting follow up appt. C/O sore throat, hurts when she swallows. Please advise. Is supposed to return to work today. ED record in chart.

## 2024-09-16 RX ORDER — OMEPRAZOLE 40 MG/1
40 CAPSULE, DELAYED RELEASE ORAL
Qty: 90 CAPSULE | Refills: 0 | Status: SHIPPED | OUTPATIENT
Start: 2024-09-16

## 2024-12-09 RX ORDER — ERGOCALCIFEROL 1.25 MG/1
50000 CAPSULE, LIQUID FILLED ORAL WEEKLY
Qty: 12 CAPSULE | Refills: 1 | OUTPATIENT
Start: 2024-12-09

## 2024-12-13 RX ORDER — OMEPRAZOLE 40 MG/1
40 CAPSULE, DELAYED RELEASE ORAL
Qty: 90 CAPSULE | Refills: 0 | Status: SHIPPED | OUTPATIENT
Start: 2024-12-13

## 2025-01-29 ENCOUNTER — OFFICE VISIT (OUTPATIENT)
Facility: CLINIC | Age: 55
End: 2025-01-29
Payer: COMMERCIAL

## 2025-01-29 VITALS
WEIGHT: 153 LBS | HEIGHT: 61 IN | OXYGEN SATURATION: 96 % | TEMPERATURE: 98 F | SYSTOLIC BLOOD PRESSURE: 120 MMHG | HEART RATE: 60 BPM | RESPIRATION RATE: 20 BRPM | BODY MASS INDEX: 28.89 KG/M2 | DIASTOLIC BLOOD PRESSURE: 88 MMHG

## 2025-01-29 DIAGNOSIS — J45.20 MILD INTERMITTENT ASTHMA WITHOUT COMPLICATION: ICD-10-CM

## 2025-01-29 DIAGNOSIS — I10 ESSENTIAL HYPERTENSION: ICD-10-CM

## 2025-01-29 DIAGNOSIS — E78.00 PURE HYPERCHOLESTEROLEMIA: ICD-10-CM

## 2025-01-29 DIAGNOSIS — E55.9 VITAMIN D DEFICIENCY: ICD-10-CM

## 2025-01-29 DIAGNOSIS — I10 ESSENTIAL HYPERTENSION: Primary | ICD-10-CM

## 2025-01-29 DIAGNOSIS — F41.0 ANXIETY ATTACK: ICD-10-CM

## 2025-01-29 DIAGNOSIS — K21.9 GASTROESOPHAGEAL REFLUX DISEASE WITHOUT ESOPHAGITIS: ICD-10-CM

## 2025-01-29 PROCEDURE — 3079F DIAST BP 80-89 MM HG: CPT | Performed by: INTERNAL MEDICINE

## 2025-01-29 PROCEDURE — 99214 OFFICE O/P EST MOD 30 MIN: CPT | Performed by: INTERNAL MEDICINE

## 2025-01-29 PROCEDURE — 3074F SYST BP LT 130 MM HG: CPT | Performed by: INTERNAL MEDICINE

## 2025-01-29 RX ORDER — ESCITALOPRAM OXALATE 5 MG/1
5 TABLET ORAL DAILY
Qty: 90 TABLET | Refills: 1 | Status: SHIPPED | OUTPATIENT
Start: 2025-01-29

## 2025-01-29 RX ORDER — HYDROXYZINE HYDROCHLORIDE 25 MG/1
25 TABLET, FILM COATED ORAL
Qty: 60 TABLET | Refills: 2 | Status: SHIPPED | OUTPATIENT
Start: 2025-01-29

## 2025-01-29 SDOH — ECONOMIC STABILITY: FOOD INSECURITY: WITHIN THE PAST 12 MONTHS, YOU WORRIED THAT YOUR FOOD WOULD RUN OUT BEFORE YOU GOT MONEY TO BUY MORE.: SOMETIMES TRUE

## 2025-01-29 SDOH — ECONOMIC STABILITY: FOOD INSECURITY: WITHIN THE PAST 12 MONTHS, THE FOOD YOU BOUGHT JUST DIDN'T LAST AND YOU DIDN'T HAVE MONEY TO GET MORE.: SOMETIMES TRUE

## 2025-01-29 ASSESSMENT — PATIENT HEALTH QUESTIONNAIRE - PHQ9
SUM OF ALL RESPONSES TO PHQ9 QUESTIONS 1 & 2: 0
2. FEELING DOWN, DEPRESSED OR HOPELESS: NOT AT ALL
1. LITTLE INTEREST OR PLEASURE IN DOING THINGS: NOT AT ALL
SUM OF ALL RESPONSES TO PHQ QUESTIONS 1-9: 0

## 2025-01-29 ASSESSMENT — ENCOUNTER SYMPTOMS
RESPIRATORY NEGATIVE: 1
GASTROINTESTINAL NEGATIVE: 1
ALLERGIC/IMMUNOLOGIC NEGATIVE: 1

## 2025-01-29 NOTE — PROGRESS NOTES
Chief Complaint   Patient presents with    Follow-up Chronic Condition     /88 (Site: Right Upper Arm, Position: Sitting)   Pulse 60   Temp 98 °F (36.7 °C) (Oral)   Resp 20   Ht 1.549 m (5' 0.98\")   Wt 69.4 kg (153 lb)   SpO2 96%   BMI 28.92 kg/m²     \"Have you been to the ER, urgent care clinic since your last visit?  Hospitalized since your last visit?\"    Yes emergency me 7/24 acute pharyngitis  Minute clinic exosed to covid 7/24  Patient first 10/24 URI    “Have you seen or consulted any other health care providers outside our system since your last visit?”    no     “Have you had a pap smear?”    Yes September 2024 VPFW    No cervical cancer screening on file

## 2025-01-29 NOTE — PATIENT INSTRUCTIONS
[facility] Utility - Financial Resources*  (Call United Way/211 if need more resources.)  Utilities  [employer]  What they offer: Partnership with the [facility] of . Assist with finding and applying for government funded programs and benefits. You can also update your benefits or report changes through [employer].  Website: https://www.TBi Connect/  Phone Number: 8335CALLVA (086-978-8839)    ShareightShOtherInbox  What they offer: EnergyShare is [facility]'s energy assistance program of last resort for anyone who faces financial hardships from unemployment or family crisis.  Phone Number: 933.736.6388  Address: [address] [place] [address]  Website: https://www.Augure/[place]/billing/billing-options/energyshare    Energy Assistance Programs (EAP) - [facility] of   What they offer: EAP assists low-income households in meeting their immediate home energy needs.      Website: https://www.Compario.[place].gov/benefit/ea/  Available assistance:   Crisis Assistance - Heating Emergencies  Fuel Assistance - Offset Heating Fuel Costs  Cooling Assistance - Applies to Cooling Utility Bills and Equipment  How to apply:  Online:  https://MediaRoostvirginiaItzCash Card Ltd./  Call:  [phone]   Paper application:   Print application from https://www.Compario.[place].gov/benefit/ea/ and submit to your [facility] Department of     [facility] Department of   [facility] of  contacts: https://www.[facility].[place].[facility]/localagency/index.cgi  [place], [place] Utility Affordability Programs  https://Cloverhill Enterprisesa.gov/public-utilities/billing  Call:  [phone]   Email:  staci@a.gov  Programs available:  Equal Monthly Payment Plan, [employer] Heat Program, [employer] Water Program, [employer] Water Conservation Program, Senior Assistance, Other Fuel Assistance Programs, PromisePay Payment Plans, Low-Income Household Water

## 2025-02-06 LAB
25(OH)D3+25(OH)D2 SERPL-MCNC: 33.3 NG/ML (ref 30–100)
ALBUMIN SERPL-MCNC: 4.4 G/DL (ref 3.8–4.9)
ALP SERPL-CCNC: 63 IU/L (ref 44–121)
ALT SERPL-CCNC: 26 IU/L (ref 0–32)
AST SERPL-CCNC: 22 IU/L (ref 0–40)
BASOPHILS # BLD AUTO: 0.1 X10E3/UL (ref 0–0.2)
BASOPHILS NFR BLD AUTO: 1 %
BILIRUB SERPL-MCNC: <0.2 MG/DL (ref 0–1.2)
BUN SERPL-MCNC: 11 MG/DL (ref 6–24)
BUN/CREAT SERPL: 13 (ref 9–23)
CALCIUM SERPL-MCNC: 10.2 MG/DL (ref 8.7–10.2)
CHLORIDE SERPL-SCNC: 103 MMOL/L (ref 96–106)
CHOLEST SERPL-MCNC: 307 MG/DL (ref 100–199)
CO2 SERPL-SCNC: 21 MMOL/L (ref 20–29)
CREAT SERPL-MCNC: 0.83 MG/DL (ref 0.57–1)
EGFRCR SERPLBLD CKD-EPI 2021: 84 ML/MIN/1.73
EOSINOPHIL # BLD AUTO: 0.1 X10E3/UL (ref 0–0.4)
EOSINOPHIL NFR BLD AUTO: 2 %
ERYTHROCYTE [DISTWIDTH] IN BLOOD BY AUTOMATED COUNT: 12.2 % (ref 11.7–15.4)
GLOBULIN SER CALC-MCNC: 3.3 G/DL (ref 1.5–4.5)
GLUCOSE SERPL-MCNC: 123 MG/DL (ref 70–99)
HCT VFR BLD AUTO: 39.8 % (ref 34–46.6)
HDLC SERPL-MCNC: 81 MG/DL
HGB BLD-MCNC: 13.3 G/DL (ref 11.1–15.9)
IMM GRANULOCYTES # BLD AUTO: 0 X10E3/UL (ref 0–0.1)
IMM GRANULOCYTES NFR BLD AUTO: 0 %
LDLC SERPL CALC-MCNC: 204 MG/DL (ref 0–99)
LYMPHOCYTES # BLD AUTO: 2.5 X10E3/UL (ref 0.7–3.1)
LYMPHOCYTES NFR BLD AUTO: 40 %
Lab: ABNORMAL
MCH RBC QN AUTO: 29.5 PG (ref 26.6–33)
MCHC RBC AUTO-ENTMCNC: 33.4 G/DL (ref 31.5–35.7)
MCV RBC AUTO: 88 FL (ref 79–97)
MONOCYTES # BLD AUTO: 0.5 X10E3/UL (ref 0.1–0.9)
MONOCYTES NFR BLD AUTO: 8 %
NEUTROPHILS # BLD AUTO: 3.1 X10E3/UL (ref 1.4–7)
NEUTROPHILS NFR BLD AUTO: 49 %
PLATELET # BLD AUTO: 293 X10E3/UL (ref 150–450)
POTASSIUM SERPL-SCNC: 3.7 MMOL/L (ref 3.5–5.2)
PROT SERPL-MCNC: 7.7 G/DL (ref 6–8.5)
RBC # BLD AUTO: 4.51 X10E6/UL (ref 3.77–5.28)
SODIUM SERPL-SCNC: 140 MMOL/L (ref 134–144)
TRIGL SERPL-MCNC: 126 MG/DL (ref 0–149)
VLDLC SERPL CALC-MCNC: 22 MG/DL (ref 5–40)
WBC # BLD AUTO: 6.3 X10E3/UL (ref 3.4–10.8)

## 2025-02-06 RX ORDER — ROSUVASTATIN CALCIUM 20 MG/1
20 TABLET, COATED ORAL DAILY
Qty: 90 TABLET | Refills: 1 | Status: SHIPPED | OUTPATIENT
Start: 2025-02-06

## 2025-03-12 RX ORDER — OMEPRAZOLE 40 MG/1
40 CAPSULE, DELAYED RELEASE ORAL
Qty: 90 CAPSULE | Refills: 1 | Status: SHIPPED | OUTPATIENT
Start: 2025-03-12

## 2025-04-18 ENCOUNTER — TRANSCRIBE ORDERS (OUTPATIENT)
Facility: HOSPITAL | Age: 55
End: 2025-04-18

## 2025-04-18 ENCOUNTER — HOSPITAL ENCOUNTER (OUTPATIENT)
Facility: HOSPITAL | Age: 55
Discharge: HOME | End: 2025-04-21
Payer: COMMERCIAL

## 2025-04-18 DIAGNOSIS — Z22.7 TB LUNG, LATENT: Primary | ICD-10-CM

## 2025-04-18 DIAGNOSIS — Z22.7 TB LUNG, LATENT: ICD-10-CM

## 2025-04-18 PROCEDURE — 71046 X-RAY EXAM CHEST 2 VIEWS: CPT

## 2025-05-12 RX ORDER — HYDROCHLOROTHIAZIDE 12.5 MG/1
12.5 TABLET ORAL EVERY MORNING
Qty: 90 TABLET | Refills: 1 | Status: SHIPPED | OUTPATIENT
Start: 2025-05-12

## (undated) DEVICE — FCPS RAD JAW 4LC 240CM W/NDL -- BX/20 RADIAL JAW 4

## (undated) DEVICE — MASK ANES INF SZ 2 PREM TAIL VLV INFL PRT UNSCENTED SGL PT